# Patient Record
Sex: MALE | Race: WHITE | NOT HISPANIC OR LATINO | Employment: OTHER | URBAN - NONMETROPOLITAN AREA
[De-identification: names, ages, dates, MRNs, and addresses within clinical notes are randomized per-mention and may not be internally consistent; named-entity substitution may affect disease eponyms.]

---

## 2020-08-12 ENCOUNTER — OFFICE VISIT CONVERTED (OUTPATIENT)
Dept: FAMILY MEDICINE CLINIC | Age: 65
End: 2020-08-12
Attending: FAMILY MEDICINE

## 2020-08-13 ENCOUNTER — HOSPITAL ENCOUNTER (OUTPATIENT)
Dept: OTHER | Facility: HOSPITAL | Age: 65
Discharge: HOME | End: 2020-08-13
Attending: FAMILY MEDICINE

## 2020-08-13 LAB
ALBUMIN SERPL-MCNC: 4.4 G/DL (ref 3.5–5)
ALBUMIN/GLOB SERPL: 1.3 {RATIO} (ref 1.4–2.6)
ALP SERPL-CCNC: 84 U/L (ref 56–155)
ALT SERPL-CCNC: 28 U/L (ref 10–40)
ANION GAP SERPL CALC-SCNC: 19 MMOL/L (ref 8–19)
AST SERPL-CCNC: 22 U/L (ref 15–50)
BASOPHILS # BLD MANUAL: 0.03 10*3/UL (ref 0–0.2)
BASOPHILS NFR BLD MANUAL: 0.4 % (ref 0–3)
BILIRUB SERPL-MCNC: 0.61 MG/DL (ref 0.2–1.3)
BUN SERPL-MCNC: 17 MG/DL (ref 5–25)
BUN/CREAT SERPL: 13 {RATIO} (ref 6–20)
CALCIUM SERPL-MCNC: 10 MG/DL (ref 8.7–10.4)
CHLORIDE SERPL-SCNC: 99 MMOL/L (ref 99–111)
CHOLEST SERPL-MCNC: 224 MG/DL (ref 107–200)
CHOLEST/HDLC SERPL: 5 {RATIO} (ref 3–6)
CONV CO2: 26 MMOL/L (ref 22–32)
CONV CREATININE URINE, RANDOM: 158.3 MG/DL (ref 10–300)
CONV MICROALBUM.,U,RANDOM: 17.1 MG/L (ref 0–20)
CONV TOTAL PROTEIN: 7.7 G/DL (ref 6.3–8.2)
CREAT UR-MCNC: 1.33 MG/DL (ref 0.7–1.2)
DEPRECATED RDW RBC AUTO: 40.7 FL
EOSINOPHIL # BLD MANUAL: 0.18 10*3/UL (ref 0–0.7)
EOSINOPHIL NFR BLD MANUAL: 2.3 % (ref 0–7)
ERYTHROCYTE [DISTWIDTH] IN BLOOD BY AUTOMATED COUNT: 12.1 % (ref 11.5–14.5)
EST. AVERAGE GLUCOSE BLD GHB EST-MCNC: 143 MG/DL
GFR SERPLBLD BASED ON 1.73 SQ M-ARVRAT: 56 ML/MIN/{1.73_M2}
GLOBULIN UR ELPH-MCNC: 3.3 G/DL (ref 2–3.5)
GLUCOSE SERPL-MCNC: 151 MG/DL (ref 70–99)
GRANS (ABSOLUTE): 4.22 10*3/UL (ref 2–8)
GRANS: 54.1 % (ref 30–85)
HBA1C MFR BLD: 17.4 G/DL (ref 14–18)
HBA1C MFR BLD: 6.6 % (ref 3.5–5.7)
HCT VFR BLD AUTO: 51.2 % (ref 42–52)
HDLC SERPL-MCNC: 45 MG/DL (ref 40–60)
IMM GRANULOCYTES # BLD: 0.01 10*3/UL (ref 0–0.54)
IMM GRANULOCYTES NFR BLD: 0.1 % (ref 0–0.43)
LDLC SERPL CALC-MCNC: 137 MG/DL (ref 70–100)
LYMPHOCYTES # BLD MANUAL: 2.54 10*3/UL (ref 1–5)
LYMPHOCYTES NFR BLD MANUAL: 10.5 % (ref 3–10)
MCH RBC QN AUTO: 30.9 PG (ref 27–31)
MCHC RBC AUTO-ENTMCNC: 34 G/DL (ref 33–37)
MCV RBC AUTO: 90.8 FL (ref 80–96)
MICROALBUMIN/CREAT UR: 10.8 MG/G{CRE} (ref 0–25)
MONOCYTES # BLD AUTO: 0.82 10*3/UL (ref 0.2–1.2)
OSMOLALITY SERPL CALC.SUM OF ELEC: 292 MOSM/KG (ref 273–304)
PLATELET # BLD AUTO: 211 10*3/UL (ref 130–400)
PMV BLD AUTO: 10.8 FL (ref 7.4–10.4)
POTASSIUM SERPL-SCNC: 4.5 MMOL/L (ref 3.5–5.3)
RBC # BLD AUTO: 5.64 10*6/UL (ref 4.7–6.1)
SODIUM SERPL-SCNC: 139 MMOL/L (ref 135–147)
TRIGL SERPL-MCNC: 212 MG/DL (ref 40–150)
TSH SERPL-ACNC: 2.83 M[IU]/L (ref 0.27–4.2)
VARIANT LYMPHS NFR BLD MANUAL: 32.6 % (ref 20–45)
VLDLC SERPL-MCNC: 42 MG/DL (ref 5–37)
WBC # BLD AUTO: 7.8 10*3/UL (ref 4.8–10.8)

## 2020-09-24 ENCOUNTER — OFFICE VISIT CONVERTED (OUTPATIENT)
Dept: FAMILY MEDICINE CLINIC | Age: 65
End: 2020-09-24
Attending: FAMILY MEDICINE

## 2020-11-19 ENCOUNTER — HOSPITAL ENCOUNTER (OUTPATIENT)
Dept: OTHER | Facility: HOSPITAL | Age: 65
Discharge: HOME | End: 2020-11-19
Attending: FAMILY MEDICINE

## 2020-11-19 LAB
ALBUMIN SERPL-MCNC: 4.1 G/DL (ref 3.5–5)
ALBUMIN/GLOB SERPL: 1.3 {RATIO} (ref 1.4–2.6)
ALP SERPL-CCNC: 80 U/L (ref 56–155)
ALT SERPL-CCNC: 27 U/L (ref 10–40)
ANION GAP SERPL CALC-SCNC: 17 MMOL/L (ref 8–19)
AST SERPL-CCNC: 21 U/L (ref 15–50)
BASOPHILS # BLD MANUAL: 0.05 10*3/UL (ref 0–0.2)
BASOPHILS NFR BLD MANUAL: 0.8 % (ref 0–3)
BILIRUB SERPL-MCNC: 0.48 MG/DL (ref 0.2–1.3)
BUN SERPL-MCNC: 18 MG/DL (ref 5–25)
BUN/CREAT SERPL: 13 {RATIO} (ref 6–20)
CALCIUM SERPL-MCNC: 8.9 MG/DL (ref 8.7–10.4)
CHLORIDE SERPL-SCNC: 100 MMOL/L (ref 99–111)
CHOLEST SERPL-MCNC: 186 MG/DL (ref 107–200)
CHOLEST/HDLC SERPL: 4.3 {RATIO} (ref 3–6)
CONV CO2: 23 MMOL/L (ref 22–32)
CONV TOTAL PROTEIN: 7.2 G/DL (ref 6.3–8.2)
CREAT UR-MCNC: 1.37 MG/DL (ref 0.7–1.2)
DEPRECATED RDW RBC AUTO: 41 FL
EOSINOPHIL # BLD MANUAL: 0.1 10*3/UL (ref 0–0.7)
EOSINOPHIL NFR BLD MANUAL: 1.6 % (ref 0–7)
ERYTHROCYTE [DISTWIDTH] IN BLOOD BY AUTOMATED COUNT: 12.3 % (ref 11.5–14.5)
EST. AVERAGE GLUCOSE BLD GHB EST-MCNC: 174 MG/DL
GFR SERPLBLD BASED ON 1.73 SQ M-ARVRAT: 54 ML/MIN/{1.73_M2}
GLOBULIN UR ELPH-MCNC: 3.1 G/DL (ref 2–3.5)
GLUCOSE SERPL-MCNC: 150 MG/DL (ref 70–99)
GRANS (ABSOLUTE): 3.63 10*3/UL (ref 2–8)
GRANS: 58.2 % (ref 30–85)
HBA1C MFR BLD: 16.1 G/DL (ref 14–18)
HBA1C MFR BLD: 7.7 % (ref 3.5–5.7)
HCT VFR BLD AUTO: 47.4 % (ref 42–52)
HDLC SERPL-MCNC: 43 MG/DL (ref 40–60)
IMM GRANULOCYTES # BLD: 0 10*3/UL (ref 0–0.54)
IMM GRANULOCYTES NFR BLD: 0 % (ref 0–0.43)
LDLC SERPL CALC-MCNC: 114 MG/DL (ref 70–100)
LYMPHOCYTES # BLD MANUAL: 1.73 10*3/UL (ref 1–5)
LYMPHOCYTES NFR BLD MANUAL: 11.7 % (ref 3–10)
MCH RBC QN AUTO: 30.9 PG (ref 27–31)
MCHC RBC AUTO-ENTMCNC: 34 G/DL (ref 33–37)
MCV RBC AUTO: 91 FL (ref 80–96)
MONOCYTES # BLD AUTO: 0.73 10*3/UL (ref 0.2–1.2)
OSMOLALITY SERPL CALC.SUM OF ELEC: 287 MOSM/KG (ref 273–304)
PLATELET # BLD AUTO: 223 10*3/UL (ref 130–400)
PMV BLD AUTO: 11.2 FL (ref 7.4–10.4)
POTASSIUM SERPL-SCNC: 4 MMOL/L (ref 3.5–5.3)
PSA SERPL-MCNC: 0.43 NG/ML (ref 0–4)
RBC # BLD AUTO: 5.21 10*6/UL (ref 4.7–6.1)
SODIUM SERPL-SCNC: 136 MMOL/L (ref 135–147)
TRIGL SERPL-MCNC: 147 MG/DL (ref 40–150)
TSH SERPL-ACNC: 3.17 M[IU]/L (ref 0.27–4.2)
VARIANT LYMPHS NFR BLD MANUAL: 27.7 % (ref 20–45)
VLDLC SERPL-MCNC: 29 MG/DL (ref 5–37)
WBC # BLD AUTO: 6.24 10*3/UL (ref 4.8–10.8)

## 2020-12-08 ENCOUNTER — OFFICE VISIT CONVERTED (OUTPATIENT)
Dept: FAMILY MEDICINE CLINIC | Age: 65
End: 2020-12-08
Attending: FAMILY MEDICINE

## 2021-04-06 ENCOUNTER — HOSPITAL ENCOUNTER (OUTPATIENT)
Dept: OTHER | Facility: HOSPITAL | Age: 66
Discharge: HOME | End: 2021-04-06
Attending: FAMILY MEDICINE

## 2021-04-06 LAB
ALBUMIN SERPL-MCNC: 3.8 G/DL (ref 3.5–5)
ALBUMIN/GLOB SERPL: 1.1 {RATIO} (ref 1.4–2.6)
ALP SERPL-CCNC: 85 U/L (ref 56–155)
ALT SERPL-CCNC: 33 U/L (ref 10–40)
ANION GAP SERPL CALC-SCNC: 17 MMOL/L (ref 8–19)
AST SERPL-CCNC: 25 U/L (ref 15–50)
BILIRUB SERPL-MCNC: 0.64 MG/DL (ref 0.2–1.3)
BUN SERPL-MCNC: 17 MG/DL (ref 5–25)
BUN/CREAT SERPL: 12 {RATIO} (ref 6–20)
CALCIUM SERPL-MCNC: 9.4 MG/DL (ref 8.7–10.4)
CHLORIDE SERPL-SCNC: 99 MMOL/L (ref 99–111)
CHOLEST SERPL-MCNC: 192 MG/DL (ref 107–200)
CHOLEST/HDLC SERPL: 4.5 {RATIO} (ref 3–6)
CONV CO2: 25 MMOL/L (ref 22–32)
CONV TOTAL PROTEIN: 7.3 G/DL (ref 6.3–8.2)
CREAT UR-MCNC: 1.37 MG/DL (ref 0.7–1.2)
EST. AVERAGE GLUCOSE BLD GHB EST-MCNC: 169 MG/DL
GFR SERPLBLD BASED ON 1.73 SQ M-ARVRAT: 54 ML/MIN/{1.73_M2}
GLOBULIN UR ELPH-MCNC: 3.5 G/DL (ref 2–3.5)
GLUCOSE SERPL-MCNC: 168 MG/DL (ref 70–99)
HBA1C MFR BLD: 7.5 % (ref 3.5–5.7)
HDLC SERPL-MCNC: 43 MG/DL (ref 40–60)
LDLC SERPL CALC-MCNC: 103 MG/DL (ref 70–100)
OSMOLALITY SERPL CALC.SUM OF ELEC: 287 MOSM/KG (ref 273–304)
POTASSIUM SERPL-SCNC: 4.5 MMOL/L (ref 3.5–5.3)
SODIUM SERPL-SCNC: 136 MMOL/L (ref 135–147)
TRIGL SERPL-MCNC: 231 MG/DL (ref 40–150)
VLDLC SERPL-MCNC: 46 MG/DL (ref 5–37)

## 2021-04-16 ENCOUNTER — OFFICE VISIT CONVERTED (OUTPATIENT)
Dept: FAMILY MEDICINE CLINIC | Age: 66
End: 2021-04-16
Attending: FAMILY MEDICINE

## 2021-05-18 NOTE — PROGRESS NOTES
Kang MantillaChencho  1955     Office/Outpatient Visit    Visit Date: Fri, Apr 16, 2021 09:04 am    Provider: Maverick Robins MD (Assistant: Quiana Wilson MA)    Location: [facility]        Electronically signed by Maverick Robins MD on  04/17/2021 09:48:57 AM                             Subjective:        CC: diabetes, blood pressure    HPI:           Patient to be evaluated for type 2 diabetes mellitus with hyperglycemia.  Current meds include an oral hypoglycemic ( Amaryl and Glucophage XR ).  He reports home blood glucose readings have been a bit high, with average fasting readings in the 150-180 mg/dL range. He checks his glucose 1 to 2 times daily.  Most recent lab results include Hemoglobin A1c:  7.5 (%) (04/06/2021), LDL:  103 (mg/dL) (04/06/2021).            Concerning essential (primary) hypertension, his current cardiac medication regimen includes a beta-blocker ( Toprol-XL ).  Review of his blood pressure log reveals systolics in the 130s and diastolics in the 80s.  He is tolerating the medication well without side effects.  Compliance with treatment has been good; he takes his medication as directed and follows up as directed.      ROS:     CONSTITUTIONAL:  Negative for chills and fever.      CARDIOVASCULAR:  Negative for chest pain and palpitations.      RESPIRATORY:  Negative for recent cough and dyspnea.      GASTROINTESTINAL:  Negative for abdominal pain, nausea and vomiting.      INTEGUMENTARY:  Negative for atypical mole(s) and rash.          Past Medical History / Family History / Social History:         Last Reviewed on 12/08/2020 10:40 AM by Maverick Robins    Past Medical History:             PAST MEDICAL HISTORY         Positive for    Type 2 Diabetes and    Hypertension;         ADVANCE DIRECTIVE Living will - His wife, Mary Ogden, would make decisions for him if needed.          PREVENTIVE HEALTH MAINTENANCE             COLORECTAL CANCER SCREENING: Up to  date (colonoscopy q10y; sigmoidoscopy q5y; Cologuard q3y) was last done 2018; colonoscopy with the following abnormalities noted-- tubular adenoma; The next colonoscopy is due           Surgical History:         Joint Replacement: right knee; 2015; Johan Pillai;         Family History:     Father: Healthy;   at age 72; Cause of death was chain saw accident     Mother: Coronary Artery Disease; Myocardial Infarction         Social History:     Occupation: Retired (Prior occupation: [employer] and )     Marital Status:      Children: None     Hobbies/Recreation: he enjoys gardening/[employer];     Exercise: Primary form of exercise is walking.          Tobacco/Alcohol/Supplements:     Last Reviewed on 2020 10:40 AM by Maverick Robins    Tobacco: He has never smoked.          Alcohol: Frequency: Just on weekends When he drinks, the average quantity of alcohol is 4 drinks.   He typically consumes beer.      Caffeine:  He admits to consuming caffeine via coffee ( 2 servings per day ).          Substance Abuse History:     Last Reviewed on 2020 10:40 AM by Maverick Robins    None         Mental Health History:     Last Reviewed on 2020 10:40 AM by Maverick Robins    NEGATIVE         Communicable Diseases (eg STDs):     Last Reviewed on 2020 10:40 AM by Maverick Robins        Current Problems:     Last Reviewed on 2020 10:40 AM by Maverick Robins    Type 2 diabetes mellitus with hyperglycemia    Essential (primary) hypertension    Encounter for screening for depression    Hyperlipidemia, unspecified    Encounter for general adult medical examination without abnormal findings    Encounter for screening for malignant neoplasm of prostate    Polyp of colon        Immunizations:     COVID-19, mRNA, LNP-S, PF, 100 mcg/0.5 mL dose 3/4/2021    COVID-19, mRNA, LNP-S, PF, 100 mcg/0.5 mL dose 2021    influenza, high-dose,  quadrivalent 9/8/2020    influenza, injectable, quadrivalent, preservative free 8/12/2020    Pneumococcal conjugate PCV 13 6/2/2017    pneumococcal polysaccharide PPV23 6/1/2018    zoster recombinant 12/3/2016    zoster recombinant 6/2/2017        Allergies:     Last Reviewed on 12/08/2020 10:40 AM by Maverick Robins    atorvastatin: Myalgia  (Adverse Reaction)        Current Medications:     Last Reviewed on 12/08/2020 10:40 AM by Maverick Robins    glimepiride 2 mg oral tablet [take 1 tablet (2 mg) by oral route once daily]    metoprolol succinate 25 mg oral Tablet, Extended Release 24 hr [take 1 tablet (25 mg) by oral route once daily]    metFORMIN 500 mg oral Tablet, Extended Release Gastric Retention 24 hr [take 2 tablets (1,000 mg) by oral route once daily with the evening meal]        Objective:        Vitals:         Current: 4/16/2021 9:07:14 AM    Ht:  6 ft, 1 in;  Wt: 237.6 lbs;  BMI: 31.3T: 96.3 F (temporal);  BP: 150/83 mm Hg (right arm, sitting);  P: 91 bpm (right arm (BP Cuff), sitting);  sCr: 1.37 mg/dL;  GFR: 60.57        Repeat:     9:27:20 AM  BP:   131/84mm Hg (right arm, sitting, p88)     Exams:     PHYSICAL EXAM:     GENERAL: Vitals recorded well developed, well nourished;     NECK: range of motion is normal; thyroid is non-palpable;     RESPIRATORY: normal respiratory rate and pattern with no distress; normal breath sounds with no rales, rhonchi, wheezes or rubs;     CARDIOVASCULAR: normal rate; rhythm is regular;  no systolic murmur;     GASTROINTESTINAL: nontender; normal bowel sounds; no masses;     LYMPHATIC: no enlargement of cervical or facial nodes; no supraclavicular nodes;     SKIN:  no significant rashes or lesions; no suspicious moles;     NEUROLOGIC: mental status: alert and oriented x 3; cranial nerves II-XII grossly intact;     PSYCHIATRIC: appropriate affect and demeanor; normal psychomotor function;         Assessment:         E11.65   Type 2 diabetes mellitus  with hyperglycemia       I10   Essential (primary) hypertension       N18.31   Chronic kidney disease, stage 3a           ORDERS:         Meds Prescribed:       [Refilled] glimepiride 2 mg oral tablet [take 1 tablet (2 mg) by oral route once daily], #90 (ninety) tablets, Refills: 1 (one)       [Refilled] metFORMIN 500 mg oral Tablet, Extended Release Gastric Retention 24 hr [take 2 tablets (1,000 mg) by oral route 2 times per day with the evening meal], #360 (three hundred and sixty) tablets, Refills: 1 (one)       [Refilled] metoprolol succinate 25 mg oral Tablet, Extended Release 24 hr [take 1 tablet (25 mg) by oral route once daily], #90 (ninety) tablets, Refills: 1 (one)                 Plan:         Type 2 diabetes mellitus with hyperglycemia         RECOMMENDATIONS given include: We have reviewed Kang's care.  His A1C was actually a little better on this check.  We will ask him to push up to 4 tabs daily of metformin and continue glimepiride.  He is going to try to be more active in the next few months.  We will contact him in 90 days for repeat labs..            Prescriptions:       [Refilled] glimepiride 2 mg oral tablet [take 1 tablet (2 mg) by oral route once daily], #90 (ninety) tablets, Refills: 1 (one)       [Refilled] metFORMIN 500 mg oral Tablet, Extended Release Gastric Retention 24 hr [take 2 tablets (1,000 mg) by oral route 2 times per day with the evening meal], #360 (three hundred and sixty) tablets, Refills: 1 (one)       [Refilled] metoprolol succinate 25 mg oral Tablet, Extended Release 24 hr [take 1 tablet (25 mg) by oral route once daily], #90 (ninety) tablets, Refills: 1 (one)         Essential (primary) hypertensionAs above.        Chronic kidney disease, stage 3aAs above.            Charge Capture:         Primary Diagnosis:     E11.65  Type 2 diabetes mellitus with hyperglycemia           Orders:      61580  Office/outpatient visit; established patient, level 4  (In-House)               I10  Essential (primary) hypertension     N18.31  Chronic kidney disease, stage 3a

## 2021-05-18 NOTE — PROGRESS NOTES
Kang Mantilla  1955     Office/Outpatient Visit    Visit Date: Thu, Sep 24, 2020 09:01 am    Provider: Zachary Garcia MD (Assistant: Abigail Riddle MA)    Location: [facility]        Electronically signed by Zachary Garcia MD on  09/24/2020 01:46:55 PM                             Subjective:        CC: taking metformin once daily Mr. Mantilla is a 65 year old male.  This is a follow-up visit.  [facility]         HPI:           Mr. Mantilla is here for a Medicare wellness visit.  The required HRA questions are integrated within this visit note. Family medical history and individual medical/surgical history were reviewed and updated.  A current height, weight, BMI, blood pressure, and pulse were recorded in the vitals section of the note and have been reviewed. Patient's medications, including supplements, were recorded in the chart and reviewed.  Current providers and suppliers were reviewed and updated.          Self-Assessment of Health: He rates his health as good. He rates his confidence of being able to control/manage most of his health problems as very confident. His physical/emotional health has limited his social activites not at all.  A review of cognitive impairment was performed, including ability to drive a car, manage finances, and any memory changes, and was found to be negative.  A review of functional ability, including bathing, dressing, walking, and urine/bowel continence as well as level of safety was performed and was found to be negative.  Falls Risk: Has not had any falls or only one fall without injury in the past year.  He denies having trouble hearing the TV/radio when others do not, having to strain to hear or understand conversations and wearing hearing aid(s).  Concerning home safety, he reports that at home he DOES have adequate lighting, a skid resistant shower/tub, handrails on stairs and functioning smoke alarms, but not grab bars in the bath or absence of throw  christina.          Immunization Status: ** Has not received pneumococcal vaccination; Physical Activity: He exercises but less than 20 minutes 3 days per week; Type of diet patient normally eats is described as well-balanced with fruits and vegetables Preventative Health updated today.            PHQ-9 Depression Screening: Completed form scanned and in chart; Total Score 1       BP today is 148/84 with a HR of 102. BP at initial visit on 8/12/20 was 138/84 with a HR of 99. He reports it was 138/75 with a HR of 96 this morning at home.      A1c was 6.6 on 8/13/20. He is on metformin 1,000 mg qd and glimepiride 2 mg qd. He checks glucose about once a day and its often 120-160. His diet is about the same, does pretty well on diet but does eat ice cream some times. He drinks water only. For breakfast he eats eggs and rojo. He thinks he gets tired with glimepiride.      Initial labs on 8/13 showed a total cholesterol fo 224 and LDL of 137. I called him in atorvastatin 40 mg qd. At night his hips hurt since starting the medicine.    ROS:     CONSTITUTIONAL:  Negative for fatigue and fever.      EYES:  Negative for blurred vision.      E/N/T:  Negative for diminished hearing and nasal congestion.      CARDIOVASCULAR:  Negative for chest pain and palpitations.      RESPIRATORY:  Negative for recent cough and dyspnea.      GASTROINTESTINAL:  Negative for abdominal pain, constipation, diarrhea, nausea and vomiting.      GENITOURINARY:  Negative for dysuria.      MUSCULOSKELETAL:  Positive for myalgias (hips).   Negative for arthralgias.      INTEGUMENTARY:  Negative for atypical mole(s) and rash.      NEUROLOGICAL:  Negative for paresthesias and weakness.      PSYCHIATRIC:  Negative for anxiety, depression and sleep disturbance.          Past Medical History / Family History / Social History:         Last Reviewed on 9/24/2020 01:46 PM by Zachary Garcia    Past Medical History:             PAST MEDICAL HISTORY          Positive for    Type 2 Diabetes and    Hypertension;         PREVENTIVE HEALTH MAINTENANCE             COLORECTAL CANCER SCREENING: Up to date (colonoscopy q10y; sigmoidoscopy q5y; Cologuard q3y) was last done 2018; colonoscopy with the following abnormalities noted-- Polyp(s); The next colonoscopy is due           Surgical History:         [place];         Family History:     Father: Healthy;   at age 72; Cause of death was chain saw accident     Mother: Coronary Artery Disease; Myocardial Infarction         Social History:     Occupation: Retired (Prior occupation: [employer] )     Marital Status:      Children: None     Hobbies/Recreation: he enjoys gardening/[employer];     Exercise: Primary form of exercise is walking.          Tobacco/Alcohol/Supplements:     Last Reviewed on 2020 01:46 PM by Zachary Garcia    Tobacco: He has never smoked.          Alcohol: Frequency: Just on weekends When he drinks, the average quantity of alcohol is 4 drinks.   He typically consumes beer.      Caffeine:  He admits to consuming caffeine via coffee ( 2 servings per day ).          Substance Abuse History:     Last Reviewed on 2020 01:46 PM by Zachary Garcia    None         Mental Health History:     Last Reviewed on 2020 01:46 PM by Zachary Garcia    NEGATIVE         Communicable Diseases (eg STDs):     Last Reviewed on 2020 01:46 PM by Zachary Garcia        Current Problems:     Last Reviewed on 2020 01:46 PM by Zachary Garcia    Type 2 diabetes mellitus with hyperglycemia    Essential (primary) hypertension    Encounter for screening for depression    Hyperlipidemia, unspecified    Encounter for general adult medical examination without abnormal findings    Encounter for screening for malignant neoplasm of prostate        Immunizations:     influenza, injectable, quadrivalent, preservative free  8/12/2020    influenza, high-dose, quadrivalent 9/8/2020    Pneumococcal conjugate PCV 13 6/2/2017    zoster recombinant 12/3/2016    zoster recombinant 6/2/2017        Allergies:     Last Reviewed on 9/24/2020 01:46 PM by Zachary Garcia    No Known Allergies.        Current Medications:     Last Reviewed on 9/24/2020 01:46 PM by Zachary Garcia    metFORMIN 1,000 mg oral Tablet, Extended Release Gastric Retention 24 hr [take 1 tablet (1,000 mg) by oral route 2 times per day with meals]    Glimepiride 2mg  [TAKE ONE TABLET BY MOUTH EVERY DAY WITH BREAKFAST]    atorvastatin 40 mg oral tablet [take 1 tablet (40 mg) by oral route once daily]        Objective:        Vitals:         Current: 9/24/2020 9:16:27 AM    Ht:  6 ft, 1 in;  Wt: 233.2 lbs;  BMI: 30.8T: 96.6 F (temporal);  BP: 148/84 mm Hg (left arm, sitting);  P: 102 bpm (left arm (BP Cuff), sitting);  sCr: 1.33 mg/dL;  GFR: 61.89VA: 20/70 OD, 20/70 OS (without correction)        Exams:     PHYSICAL EXAM:     GENERAL: Vitals recorded well developed, well nourished;  well groomed;  no apparent distress;     EYES: extraocular movements intact; conjunctiva and cornea are normal; PERRLA;     E/N/T: OROPHARYNX:  normal mucosa, dentition, gingiva, and posterior pharynx;     NECK: range of motion is normal; trachea is midline;     RESPIRATORY: normal respiratory rate and pattern with no distress; normal breath sounds with no rales, rhonchi, wheezes or rubs;     CARDIOVASCULAR: mildly tachycardic;  rhythm is regular;  no systolic murmur; no edema;     GASTROINTESTINAL: nontender; normal bowel sounds;     LYMPHATIC: no enlargement of cervical or facial nodes;     MUSCULOSKELETAL: normal gait; normal overall tone spine: no scoliosis or other abnormal spinal curvatures;     NEUROLOGIC: mental status: alert and oriented x 3; GROSSLY INTACT     PSYCHIATRIC:  appropriate affect and demeanor; normal speech pattern; grossly normal memory;         Assessment:         Z00.00    Encounter for general adult medical examination without abnormal findings       Z13.31   Encounter for screening for depression       I10   Essential (primary) hypertension       E11.65   Type 2 diabetes mellitus with hyperglycemia       E78.5   Hyperlipidemia, unspecified       Z12.5   Encounter for screening for malignant neoplasm of prostate           ORDERS:         Meds Prescribed:       [New Rx] metoprolol succinate 25 mg oral Tablet, Extended Release 24 hr [take 1 tablet (25 mg) by oral route once daily], #90 (ninety) tablets, Refills: 1 (one)       [New Rx] Januvia 25 mg oral tablet [take 1 tablet (25 mg) by oral route once daily], #90 (ninety) tablets, Refills: 0 (zero)         Lab Orders:       APPTO  Appointment need  (In-House)            FUTURE  Future order to be done at patients convenience  (Send-Out)            [facility]  ([facility])            35962  DIAB[address] [place] [address] LIPID,CMP, A1C: [id number], 35057, 09938  (Send-Out)            30986  TSH - [facility] TSH  (Send-Out)            FUTURE  Future order to be done at patients convenience  (Send-Out)            *  [facility] Medicare screening PSA  (Send-Out)              Other Orders:       1101F  Pt screen for fall risk; document no falls in past year or only 1 fall w/o injury in past year (TREVON)  (In-House)              Depression screen negative  (In-House)                      Plan:         Encounter for general adult medical examination without abnormal findingsWe discussed nutrition and pt advised to eat a low sugar, non-processed diet. We discussed physical activity and healthy weight and pt is advised to exercise 150 minutes per week in addition to his activity at work. Pt does not smoke, does not use tobacco products, and does not use illicit drugs. Pt is advised to limit alcohol to 2 drinks per day at most. He is advised to stay up to date with dental health and immunizations. He is UTD on screenings.     MIPS Has had no  falls or only one fall without injury in the past year ADVANCE DIRECTIVES (Please update in PMH): Living will signed request copy           Orders:       1101F  Pt screen for fall risk; document no falls in past year or only 1 fall w/o injury in past year (TREVON)  (In-House)              Encounter for screening for depression    MIPS PHQ-9 Depression Screening: Completed form scanned and in chart; Total Score 1; Negative Depression Screen           Orders:         Depression screen negative  (In-House)              Essential (primary) hypertensionPt is reluctantly agreeable to start metoprolol 25 mg qd.           Prescriptions:       [New Rx] metoprolol succinate 25 mg oral Tablet, Extended Release 24 hr [take 1 tablet (25 mg) by oral route once daily], #90 (ninety) tablets, Refills: 1 (one)         Type 2 diabetes mellitus with hyperglycemiaDM 2 is well controlled, will start januvia 25 mg qd to replace glimepiride 2 mg qd if januvia is not expensive. He is OK with just continuing glimepiride if januvia is expensive. Consider trulicity as well.         FOLLOW-UP: Schedule a follow-up visit in 2 months.:.      FOLLOW-UP TESTING #1: FOLLOW-UP LABORATORY:  Labs to be scheduled in the future include CBC, Diabetes Panel 1; CMP, Lipid, A1C, and TSH.   Patient to schedule in 2 months.            Prescriptions:       [New Rx] Januvia 25 mg oral tablet [take 1 tablet (25 mg) by oral route once daily], #90 (ninety) tablets, Refills: 0 (zero)           Orders:       APPTO  Appointment need  (In-House)            FUTURE  Future order to be done at patients convenience  (Send-Out)            30713  BDCBC - [facility] CBC with 3 part diff  (Send-Out)            65238  DIAB1 - [facility] LIPID,CMP, A1C: 74911, 30164, 26724  (Send-Out)            60083  TSH - [facility] TSH  (Send-Out)              Hyperlipidemia, unspecifiedHip pain is gradually improving but consider reducing to 20 mg qd at next visit.         Encounter for screening for  malignant neoplasm of prostate        FOLLOW-UP TESTING #1: FOLLOW-UP LABORATORY:  Labs to be scheduled in the future include PSA Screening Medicare patients.            Orders:       FUTURE  Future order to be done at patients convenience  (Send-Out)            *  PRSAS Medicare screening PSA  (Send-Out)                  Patient Recommendations:        For  Encounter for general adult medical examination without abnormal findings:    I also recommend request copy.          For  Type 2 diabetes mellitus with hyperglycemia:    Schedule a follow-up visit in 2 months.                APPOINTMENT INFORMATION:        Monday Tuesday Wednesday Thursday Friday Saturday Sunday            Time:___________________AM  PM   Date:_____________________         The following laboratory testing has been ordered: CBC TSH Schedule the above testing in 2 months.          For  Encounter for screening for malignant neoplasm of prostate:            The following laboratory testing has been ordered:             Charge Capture:         Primary Diagnosis:     Z00.00  Encounter for general adult medical examination without abnormal findings           Orders:      42284  Preventive medicine, established patient, age 65+ years  (In-House)            1101F  Pt screen for fall risk; document no falls in past year or only 1 fall w/o injury in past year (TREVON)  (In-House)              Z13.31  Encounter for screening for depression           Orders:        Depression screen negative  (In-House)              I10  Essential (primary) hypertension     E11.65  Type 2 diabetes mellitus with hyperglycemia           Orders:      APPTO  Appointment need  (In-House)              E78.5  Hyperlipidemia, unspecified     Z12.5  Encounter for screening for malignant neoplasm of prostate

## 2021-05-18 NOTE — PROGRESS NOTES
Kang Mantilla  1955     Office/Outpatient Visit    Visit Date: Tue, Dec 8, 2020 09:52 am    Provider: Maverick Robins MD (Assistant: Mary Torres,  )    Location: [facility]        Electronically signed by Maverick Robins MD on  12/09/2020 09:06:10 AM                             Subjective:        CC: diabetes, blood pressure, cholesterol, colon polyps    HPI:           Patient to be evaluated for type 2 diabetes mellitus with hyperglycemia.  Current meds include an oral hypoglycemic ( Amaryl and Glucophage XR ).  He reports home blood glucose readings have been fairly good, with average fasting glucoses running in the 120-150 mg/dL range. He checks his glucose 1 to 2 times daily.  Most recent lab results include Hemoglobin A1c:  7.7 (%) (11/19/2020), LDL:  114 (mg/dL) (11/19/2020).            Essential (primary) hypertension details; his current cardiac medication regimen includes a beta-blocker ( Toprol-XL ).  He is tolerating the medication well without side effects.  Compliance with treatment has been good; he takes his medication as directed and follows up as directed.            Hyperlipidemia, unspecified details; current treatment includes diet.  Compliance with treatment has been good; he follows up as directed.  Kang was on atorvastatin, but he stopped taking that due to some intolerance and pain in the legs.          Kang also has history of colon polyps - tubular adenoma - for which he will be due for colonoscopy in the next few months.    ROS:     CONSTITUTIONAL:  Negative for chills and fever.      CARDIOVASCULAR:  Negative for chest pain and palpitations.      RESPIRATORY:  Negative for recent cough and dyspnea.      GASTROINTESTINAL:  Negative for abdominal pain, nausea and vomiting.      INTEGUMENTARY:  Negative for atypical mole(s) and rash.          Past Medical History / Family History / Social History:         Last Reviewed on 12/08/2020 10:40 AM by  Maverick Robins    Past Medical History:             PAST MEDICAL HISTORY         Positive for    Type 2 Diabetes and    Hypertension;         ADVANCE DIRECTIVE Living will - His wife, Mary Ogden, would make decisions for him if needed.          PREVENTIVE HEALTH MAINTENANCE             COLORECTAL CANCER SCREENING: Up to date (colonoscopy q10y; sigmoidoscopy q5y; Cologuard q3y) was last done 2018; colonoscopy with the following abnormalities noted-- tubular adenoma; The next colonoscopy is due           Surgical History:         Joint Replacement: right knee; 2015; [place] - Dr. Pillai;         Family History:     Father: Healthy;   at age 72; Cause of death was chain saw accident     Mother: Coronary Artery Disease; Myocardial Infarction         Social History:     Occupation: Retired (Prior occupation: [employer] )     Marital Status:      Children: None     Hobbies/Recreation: he enjoys gardening/Calcivising;     Exercise: Primary form of exercise is walking.          Tobacco/Alcohol/Supplements:     Last Reviewed on 2020 10:40 AM by Maverick Robins    Tobacco: He has never smoked.          Alcohol: Frequency: Just on weekends When he drinks, the average quantity of alcohol is 4 drinks.   He typically consumes beer.      Caffeine:  He admits to consuming caffeine via coffee ( 2 servings per day ).          Substance Abuse History:     Last Reviewed on 2020 10:40 AM by Maverick Robins    None         Mental Health History:     Last Reviewed on 2020 10:40 AM by Maverick Robins    NEGATIVE         Communicable Diseases (eg STDs):     Last Reviewed on 2020 10:40 AM by Maverick Robins        Current Problems:     Last Reviewed on 2020 10:40 AM by Maverick Robins    Type 2 diabetes mellitus with hyperglycemia    Essential (primary) hypertension    Encounter for screening for depression    Hyperlipidemia,  unspecified    Encounter for general adult medical examination without abnormal findings    Encounter for screening for malignant neoplasm of prostate    Polyp of colon        Immunizations:     influenza, high-dose, quadrivalent 9/8/2020    influenza, injectable, quadrivalent, preservative free 8/12/2020    Pneumococcal conjugate PCV 13 6/2/2017    zoster recombinant 12/3/2016    zoster recombinant 6/2/2017        Allergies:     Last Reviewed on 12/08/2020 10:40 AM by Maverick Robins    atorvastatin: Myalgia  (Adverse Reaction)        Current Medications:     Last Reviewed on 12/08/2020 10:40 AM by Maverick Robins    metFORMIN 1,000 mg oral Tablet, Extended Release Gastric Retention 24 hr [take 1 tablet (1,000 mg) by oral route 2 times per day with meals]    Glimepiride 2mg  [TAKE ONE TABLET BY MOUTH EVERY DAY WITH BREAKFAST]    metoprolol succinate 25 mg oral Tablet, Extended Release 24 hr [take 1 tablet (25 mg) by oral route once daily]        Objective:        Vitals:         Current: 12/8/2020 10:00:12 AM    Ht:  6 ft, 1 in;  Wt: 235.6 lbs;  BMI: 31.1T: 96.5 F (temporal);  BP: 129/77 mm Hg (left arm, sitting);  P: 90 bpm (left arm (BP Cuff), sitting);  sCr: 1.37 mg/dL;  GFR: 60.35        Exams:     PHYSICAL EXAM:     GENERAL: Vitals recorded well developed, obese;     EYES: extraocular movements intact; conjunctiva and cornea are normal; PERRL;     E/N/T: EARS:  normal external auditory canals and tympanic membranes;  grossly normal hearing;     NECK: range of motion is normal; thyroid is non-palpable;     RESPIRATORY: normal respiratory rate and pattern with no distress; normal breath sounds with no rales, rhonchi, wheezes or rubs;     CARDIOVASCULAR: normal rate; rhythm is regular;  no systolic murmur;     GASTROINTESTINAL: nontender; normal bowel sounds; no masses;     LYMPHATIC: no enlargement of cervical or facial nodes; no supraclavicular nodes;     SKIN:  no significant rashes or lesions;  no suspicious moles;     NEUROLOGIC: mental status: alert and oriented x 3; cranial nerves II-XII grossly intact;     PSYCHIATRIC: appropriate affect and demeanor; normal psychomotor function;     Foot exam performed.      Left foot exam    Protective sensation using Monofilament test: NORMAL sensation. Patient detects .07 grams of force which is considered normal.    Vascular status: normal peripheral vascular exam with palpable dorsal pedal and posterior tibal pulses and brisk digital capillary refill    Skin is intact without sores or ulcers    Right foot exam    Protective sensation using Monofilament test: NORMAL sensation. Patient detects .07 grams of force which is considered normal.    Vascular status: normal peripheral vascular exam with palpable dorsal pedal and posterior tibal pulses and brisk digital capillary refill    Skin is intact without sores or ulcers         Assessment:         E11.65   Type 2 diabetes mellitus with hyperglycemia       I10   Essential (primary) hypertension       E78.5   Hyperlipidemia, unspecified       K63.5   Polyp of colon           ORDERS:         Meds Prescribed:       [Refilled] glimepiride 2 mg oral tablet [take 1 tablet (2 mg) by oral route once daily], #90 (ninety) tablets, Refills: 1 (one)       [Refilled] metFORMIN 500 mg oral Tablet, Extended Release Gastric Retention 24 hr [take 2 tablets (1,000 mg) by oral route once daily with the evening meal], #180 (one hundred and eighty) tablets, Refills: 1 (one)       [Refilled] metoprolol succinate 25 mg oral Tablet, Extended Release 24 hr [take 1 tablet (25 mg) by oral route once daily], #90 (ninety) tablets, Refills: 1 (one)         Radiology/Test Orders:       3017F  Colorectal CA screen results documented and reviewed (PV)  (In-House)              Other Orders:       2028F  Foot examination performed (includes examination through visual inspection, sensory exam with monofilament, and pulse exam - report when any of the  three components are completed) (DM)4  (In-House)            1101F  Pt screen for fall risk; document no falls in past year or only 1 fall w/o injury in past year (TREVON)  (In-House)                      Plan:         Type 2 diabetes mellitus with hyperglycemia        RECOMMENDATIONS given include: Today, we have reviewed Kang's care.  I'm going to advise no specific change today.  We will continue his current medications.  We also reviewed his cholesterol.  He did not do well with atorvastatin.  For now, we will not give additional treatment for this.  His pressure is well controlled.  Consider ACE inhibitor at some point.  He will be due for colonoscopy in February.  We will reach out to him then to see if he wants to schedule..  RAMIRO Has had no falls or only one fall without injury in the past year Vaccines Flu and Pneumonia updated in Shot record Colorectal Cancer Screening is up to date and the results are in the chart           Prescriptions:       [Refilled] glimepiride 2 mg oral tablet [take 1 tablet (2 mg) by oral route once daily], #90 (ninety) tablets, Refills: 1 (one)       [Refilled] metFORMIN 500 mg oral Tablet, Extended Release Gastric Retention 24 hr [take 2 tablets (1,000 mg) by oral route once daily with the evening meal], #180 (one hundred and eighty) tablets, Refills: 1 (one)       [Refilled] metoprolol succinate 25 mg oral Tablet, Extended Release 24 hr [take 1 tablet (25 mg) by oral route once daily], #90 (ninety) tablets, Refills: 1 (one)           Orders:       2028F  Foot examination performed (includes examination through visual inspection, sensory exam with monofilament, and pulse exam - report when any of the three components are completed) (DM)4  (In-House)            1101F  Pt screen for fall risk; document no falls in past year or only 1 fall w/o injury in past year (TREVON)  (In-House)            3017F  Colorectal CA screen results documented and reviewed (PV)  (In-House)               Essential (primary) hypertensionAs above.        Hyperlipidemia, unspecifiedAs above.        Polyp of colonAs above.            Charge Capture:         Primary Diagnosis:     E11.65  Type 2 diabetes mellitus with hyperglycemia           Orders:      77967  Office/outpatient visit; established patient, level 4  (In-House)            2028F  Foot examination performed (includes examination through visual inspection, sensory exam with monofilament, and pulse exam - report when any of the three components are completed) (DM)4  (In-House)            1101F  Pt screen for fall risk; document no falls in past year or only 1 fall w/o injury in past year (TREVON)  (In-House)            3017F  Colorectal CA screen results documented and reviewed (PV)  (In-House)              I10  Essential (primary) hypertension     E78.5  Hyperlipidemia, unspecified     K63.5  Polyp of colon         ADDENDUMS:      ____________________________________    Addendum: 12/12/2020 07:47 JONE - Maverick Robins        Records from cardiology obtained and reviewed.

## 2021-05-18 NOTE — PROGRESS NOTES
Kang Mantilla  1955     Office/Outpatient Visit    Visit Date: Wed, Aug 12, 2020 09:36 am    Provider: Zachary Garcia MD (Assistant: Mary Gutiérrez MA)    Location: [facility]        Electronically signed by Zachary Garcia MD on  08/12/2020 07:08:22 PM                             Subjective:        CC: Mr. Mantilla is a 64 year old male.  This is his first visit to the clinic.  Establish care due to Dr. Quintana retiring.          HPI:           PHQ-9 Depression Screening: Completed form scanned and in chart; Total Score 0       BP today is 138/84 with a HR of 99. He checks BP occasionally and its typically normal.      Pt has DM 2. Last A1c was 7.6 around 7 months ago. He is on metformin 1,000 mg qd and glimepiride 2 mg qd. He checks glucose about once a day and its often 120-160. He does pretty well on diet but does eat ice cream some times. He drinks water only. For breakfast he eats eggs and rojo.    ROS:     CONSTITUTIONAL:  Negative for fatigue and fever.      EYES:  Negative for blurred vision.      E/N/T:  Negative for diminished hearing and nasal congestion.      CARDIOVASCULAR:  Negative for chest pain and palpitations.      RESPIRATORY:  Negative for recent cough and dyspnea.      GASTROINTESTINAL:  Negative for abdominal pain, constipation, diarrhea, nausea and vomiting.      GENITOURINARY:  Negative for dysuria.      MUSCULOSKELETAL:  Negative for arthralgias and myalgias.      INTEGUMENTARY:  Negative for atypical mole(s) and rash.      NEUROLOGICAL:  Negative for paresthesias and weakness.      PSYCHIATRIC:  Negative for anxiety, depression and sleep disturbance.          Past Medical History / Family History / Social History:         Last Reviewed on 8/12/2020 10:24 AM by Zachary Garcia    Past Medical History:             PAST MEDICAL HISTORY         Positive for    Type 2 Diabetes and    Hypertension;         PREVENTIVE HEALTH MAINTENANCE             COLORECTAL CANCER  SCREENING: Up to date (colonoscopy q10y; sigmoidoscopy q5y; Cologuard q3y) was last done 2018; colonoscopy with the following abnormalities noted-- Polyp(s); The next colonoscopy is due           Surgical History:         Joint Replacement: right knee; ; [place] [facility] - Dr. Pillai;         Family History:     Father: Healthy;   at age 72; Cause of death was chain saw accident     Mother: Coronary Artery Disease; Myocardial Infarction         Social History:     Occupation: Retired (Prior occupation: [employer] )     Marital Status:      Children: None     Hobbies/Recreation: he enjoys gardening/[employer]ing;     Exercise: Primary form of exercise is walking.          Tobacco/Alcohol/Supplements:     Last Reviewed on 2020 10:24 AM by Zachary Garcia    Tobacco: He has never smoked.          Alcohol: Frequency: Just on weekends When he drinks, the average quantity of alcohol is 4 drinks.   He typically consumes beer.      Caffeine:  He admits to consuming caffeine via coffee ( 2 servings per day ).          Substance Abuse History:     Last Reviewed on 2020 10:24 AM by Zachary Garcia    None         Mental Health History:     Last Reviewed on 2020 10:24 AM by Zachary Garcia    NEGATIVE         Communicable Diseases (eg STDs):     Last Reviewed on 2020 10:24 AM by Zachary Garcia        Current Problems:     Last Reviewed on 2020 10:24 AM by Zachary Garcia    Type 2 diabetes mellitus with hyperglycemia    Elevated blood-pressure reading, without diagnosis of hypertension    Encounter for screening for depression        Immunizations:     influenza, injectable, quadrivalent, preservative free 2020        Allergies:     Last Reviewed on 2020 10:24 AM by Zachary Garcia    No Known Allergies.        Current Medications:     Last Reviewed on 2020 10:24 AM by Zachary Garcia    metFORMIN 1,000 mg oral Tablet, Extended Release Gastric  Retention 24 hr [take 1 tablet (1,000 mg) by oral route 2 times per day with meals]    Glimepiride 2mg [TAKE ONE TABLET BY MOUTH EVERY DAY WITH BREAKFAST]        Objective:        Vitals:         Current: 8/12/2020 9:41:13 AM    Ht:  6 ft, 1 in;  Wt: 233.4 lbs;  BMI: 30.8T: 97.3 F (temporal);  BP: 138/84 mm Hg (right arm, sitting);  P: 99 bpm (right arm (BP Cuff), sitting)        Repeat:     10:36:3 AM  BP:   123/79mm Hg (Pulse 90)     Exams:     PHYSICAL EXAM:     GENERAL: Vitals recorded well developed, well nourished;  well groomed;  no apparent distress;     EYES: extraocular movements intact; conjunctiva and cornea are normal; PERRLA;     E/N/T: OROPHARYNX:  normal mucosa, dentition, gingiva, and posterior pharynx;     NECK: range of motion is normal; thyroid exam reveals not enlarged;     RESPIRATORY: normal respiratory rate and pattern with no distress; normal breath sounds with no rales, rhonchi, wheezes or rubs;     CARDIOVASCULAR: mildly tachycardic;  rhythm is regular;  no systolic murmur; no edema;     GASTROINTESTINAL: nontender; normal bowel sounds;     LYMPHATIC: no enlargement of cervical or facial nodes;     MUSCULOSKELETAL: normal gait; normal overall tone     NEUROLOGIC: mental status: alert and oriented x 3; reflexes: knee jerks: 2+;     PSYCHIATRIC:  appropriate affect and demeanor; normal speech pattern; grossly normal memory;         Assessment:         Z13.31   Encounter for screening for depression       R03.0   Elevated blood-pressure reading, without diagnosis of hypertension       E11.65   Type 2 diabetes mellitus with hyperglycemia           ORDERS:         Lab Orders:       FUTURE  Future order to be done at patients convenience  (Send-Out)            79550  BDCBC - [facility] CBC with 3 part diff  (Send-Out)            67359  DIAB2 - [facility] CMP A1C LIPID AND MICRO ALBUM CR RATIO: [id number]  (Send-Out)            79576  TSH - [facility] TSH  (Send-Out)            APPTO  Appointment  need  (In-House)              Other Orders:         Depression screen negative  (In-House)                      Plan:         Encounter for screening for depression    MIPS PHQ-9 Depression Screening: Completed form scanned and in chart; Total Score 0; Negative Depression Screen           Orders:         Depression screen negative  (In-House)              Elevated blood-pressure reading, without diagnosis of hypertensionPt advised BP and HR are a little elevated and he would benefit from metoprolol 25 mg qd but he defers. No shortness of breath with exertion and he is overall doing very well so this is reasonable.         Type 2 diabetes mellitus with hyperglycemiaDM 2 seems to be well controlled with A1c of 7.6 7 months ago. For now, cont metformin 1,000 mg qd and glimepiride 2 mg qd. Will recheck labs this week.         FOLLOW-UP: Schedule a follow-up appointment in 6 weeks.:.      FOLLOW-UP TESTING #1: FOLLOW-UP LABORATORY:  Labs to be scheduled in the future include CBC, Diabetes Panel 2;CMP, A1C, Lipid, Microalbumin:Creatinine Ratio, and TSH.            Orders:       FUTURE  Future order to be done at patients convenience  (Send-Out)            54187  BDCB - [facility] CBC with 3 part diff  (Send-Out)            26068  DIAB - [facility] CMP A1C LIPID AND MICRO ALBUM CR RATIO: [id number]  (Send-Out)            23692  [facility] - [facility] TSH  (Send-Out)            APPTO  Appointment need  (In-House)                  Patient Recommendations:        For  Type 2 diabetes mellitus with hyperglycemia:    Schedule a follow-up visit in 6 weeks.                APPOINTMENT INFORMATION:        Monday Tuesday Wednesday Thursday Friday Saturday Sunday            Time:___________________AM  PM   Date:_____________________         The following laboratory testing has been ordered: CBC TSH             Charge Capture:         Primary Diagnosis:     Z13.31  Encounter for screening for depression           Orders:       28839  Office visit - new pt, level 3  (In-House)              Depression screen negative  (In-House)              R03.0  Elevated blood-pressure reading, without diagnosis of hypertension     E11.65  Type 2 diabetes mellitus with hyperglycemia           Orders:      APPTO  Appointment need  (In-House)

## 2021-07-02 VITALS
HEIGHT: 73 IN | DIASTOLIC BLOOD PRESSURE: 84 MMHG | TEMPERATURE: 96.3 F | HEART RATE: 91 BPM | BODY MASS INDEX: 31.49 KG/M2 | SYSTOLIC BLOOD PRESSURE: 131 MMHG | WEIGHT: 237.6 LBS

## 2021-07-02 VITALS
HEIGHT: 73 IN | SYSTOLIC BLOOD PRESSURE: 129 MMHG | DIASTOLIC BLOOD PRESSURE: 77 MMHG | TEMPERATURE: 96.5 F | WEIGHT: 235.6 LBS | BODY MASS INDEX: 31.23 KG/M2 | HEART RATE: 90 BPM

## 2021-07-02 VITALS
BODY MASS INDEX: 30.91 KG/M2 | WEIGHT: 233.2 LBS | SYSTOLIC BLOOD PRESSURE: 148 MMHG | DIASTOLIC BLOOD PRESSURE: 84 MMHG | TEMPERATURE: 96.6 F | HEIGHT: 73 IN | HEART RATE: 102 BPM

## 2021-07-02 VITALS
BODY MASS INDEX: 30.93 KG/M2 | WEIGHT: 233.4 LBS | HEIGHT: 73 IN | TEMPERATURE: 97.3 F | SYSTOLIC BLOOD PRESSURE: 123 MMHG | HEART RATE: 99 BPM | DIASTOLIC BLOOD PRESSURE: 79 MMHG

## 2021-09-07 ENCOUNTER — OFFICE VISIT (OUTPATIENT)
Dept: FAMILY MEDICINE CLINIC | Age: 66
End: 2021-09-07

## 2021-09-07 VITALS
WEIGHT: 230.2 LBS | HEART RATE: 99 BPM | BODY MASS INDEX: 30.51 KG/M2 | TEMPERATURE: 97.3 F | HEIGHT: 73 IN | DIASTOLIC BLOOD PRESSURE: 70 MMHG | SYSTOLIC BLOOD PRESSURE: 120 MMHG

## 2021-09-07 DIAGNOSIS — R05.9 COUGH: Primary | ICD-10-CM

## 2021-09-07 DIAGNOSIS — J06.9 VIRAL UPPER RESPIRATORY TRACT INFECTION: ICD-10-CM

## 2021-09-07 LAB
EXPIRATION DATE: NORMAL
FLUAV AG UPPER RESP QL IA.RAPID: NOT DETECTED
FLUBV AG UPPER RESP QL IA.RAPID: NOT DETECTED
INTERNAL CONTROL: NORMAL
Lab: NORMAL
SARS-COV-2 AG UPPER RESP QL IA.RAPID: NOT DETECTED

## 2021-09-07 PROCEDURE — 87428 SARSCOV & INF VIR A&B AG IA: CPT | Performed by: FAMILY MEDICINE

## 2021-09-07 PROCEDURE — U0004 COV-19 TEST NON-CDC HGH THRU: HCPCS | Performed by: FAMILY MEDICINE

## 2021-09-07 PROCEDURE — 99213 OFFICE O/P EST LOW 20 MIN: CPT | Performed by: FAMILY MEDICINE

## 2021-09-07 PROCEDURE — C9803 HOPD COVID-19 SPEC COLLECT: HCPCS | Performed by: FAMILY MEDICINE

## 2021-09-07 RX ORDER — PREDNISONE 20 MG/1
TABLET ORAL
COMMUNITY
Start: 2021-06-27 | End: 2021-11-30

## 2021-09-07 RX ORDER — METFORMIN HYDROCHLORIDE 500 MG/1
500 TABLET, EXTENDED RELEASE ORAL DAILY
COMMUNITY
Start: 2021-08-28 | End: 2021-11-08

## 2021-09-07 RX ORDER — METOPROLOL SUCCINATE 25 MG/1
25 TABLET, EXTENDED RELEASE ORAL DAILY
COMMUNITY
Start: 2021-08-31 | End: 2021-11-29

## 2021-09-07 RX ORDER — GLIMEPIRIDE 2 MG/1
2 TABLET ORAL DAILY
COMMUNITY
Start: 2021-08-12 | End: 2021-11-09

## 2021-09-07 NOTE — PROGRESS NOTES
Kang Mantilla presents to [facility].    Chief Complaint: fever    Subjective       History of Present Illness:  CAROLYN Kapadia has acute symptoms of fatigue and low grade fever with headache (mild).  No body aches/sinus congestion or drainage.  No known covid exposure, no loss of taste or smell, no diarrhea.  On tylenol for fever    Review of Systems:  Review of Systems   Constitutional: Positive for chills, fatigue and fever.   HENT: Negative for congestion, ear discharge and sore throat.    Respiratory: Positive for cough. Negative for shortness of breath and wheezing.    Cardiovascular: Negative for chest pain.   Gastrointestinal: Negative for abdominal pain, constipation, diarrhea, nausea, vomiting and GERD.   Genitourinary: Negative for flank pain.   Musculoskeletal: Negative for myalgias.   Neurological: Positive for headache. Negative for dizziness.   Psychiatric/Behavioral: Negative for depressed mood.        Objective   Medical History:  No past medical history on file.  No past surgical history on file.   History reviewed. No pertinent family history.  Social History     Tobacco Use   • Smoking status: Never Smoker   • Smokeless tobacco: Never Used   Substance Use Topics   • Alcohol use: Not Currently       Health Maintenance Due   Topic Date Due   • COLORECTAL CANCER SCREENING  Never done   • TDAP/TD VACCINES (1 - Tdap) Never done   • ZOSTER VACCINE (1 of 2) Never done   • Pneumococcal Vaccine 65+ (1 of 1 - PPSV23) Never done   • HEPATITIS C SCREENING  Never done   • ANNUAL WELLNESS VISIT  Never done          There is no immunization history on file for this patient.    No Known Allergies     Medications:  Current Outpatient Medications on File Prior to Visit   Medication Sig   • glimepiride (AMARYL) 2 MG tablet Take 2 mg by mouth Daily.   • metFORMIN ER (GLUCOPHAGE-XR) 500 MG 24 hr tablet Take 500 mg by mouth Daily.   • metoprolol succinate XL (TOPROL-XL) 25 MG 24 hr tablet  "Take 25 mg by mouth Daily.   • predniSONE (DELTASONE) 20 MG tablet      No current facility-administered medications on file prior to visit.       Vital Signs:   /70   Pulse 99   Temp 97.3 °F (36.3 °C)   Ht 185.4 cm (72.99\")   Wt 104 kg (230 lb 3.2 oz)   BMI 30.38 kg/m²       Physical Exam:  Physical Exam  Vitals and nursing note reviewed.   Constitutional:       General: He is not in acute distress.     Appearance: Normal appearance. He is not ill-appearing, toxic-appearing or diaphoretic.   HENT:      Head: Normocephalic and atraumatic.      Right Ear: Tympanic membrane, ear canal and external ear normal.      Left Ear: Tympanic membrane, ear canal and external ear normal.      Nose: No congestion or rhinorrhea.      Mouth/Throat:      Pharynx: Oropharynx is clear. No oropharyngeal exudate or posterior oropharyngeal erythema.   Eyes:      Extraocular Movements: Extraocular movements intact.      Conjunctiva/sclera: Conjunctivae normal.      Pupils: Pupils are equal, round, and reactive to light.   Cardiovascular:      Rate and Rhythm: Normal rate and regular rhythm.      Heart sounds: Normal heart sounds.   Pulmonary:      Breath sounds: Normal breath sounds. No wheezing, rhonchi or rales.   Abdominal:      General: Abdomen is flat. Bowel sounds are normal.      Palpations: Abdomen is soft.   Musculoskeletal:      Cervical back: Neck supple. No rigidity.   Lymphadenopathy:      Cervical: No cervical adenopathy.   Skin:     General: Skin is warm and dry.      Capillary Refill: Capillary refill takes less than 2 seconds.   Neurological:      Mental Status: He is alert and oriented to person, place, and time.   Psychiatric:         Mood and Affect: Mood normal.         Behavior: Behavior normal.         Result Review      The following data was reviewed by María Sin MD on 09/07/2021.  Lab Results   Component Value Date    WBC 6.24 11/19/2020    HGB 16.10 11/19/2020    HCT 47.4 11/19/2020    MCV " 91.0 11/19/2020    .00 11/19/2020     Lab Results   Component Value Date    GLUCOSE 174 (H) 01/23/2015    BUN 17 04/06/2021    CREATININE 1.37 (H) 04/06/2021    BCR 12 04/06/2021    K 4.5 04/06/2021    CO2 25 04/06/2021    CALCIUM 9.4 04/06/2021    ALBUMIN 3.8 04/06/2021    LABIL2 1.1 (L) 04/06/2021    AST 25 04/06/2021    ALT 33 04/06/2021     Lab Results   Component Value Date    CHLPL 192 04/06/2021    TRIG 231 (H) 04/06/2021    HDL 43 04/06/2021     (H) 04/06/2021     Lab Results   Component Value Date    TSH 3.170 11/19/2020     Lab Results   Component Value Date    HGBA1C 7.5 (H) 04/06/2021     Lab Results   Component Value Date    PSA 0.43 11/19/2020                       Assessment and Plan:          Diagnoses and all orders for this visit:    1. Cough (Primary)  -     POCT SARS-CoV-2 Antigen EILEEN + Flu    2. Viral upper respiratory tract infection  -     COVID-19,  MAD/WING IN-HOUSE, NP SWAB IN TRANSPORT MEDIA 8-10 HR TAT - Swab, Nasopharynx; Future  -     COVID-19,  MAD/WING IN-HOUSE, NP SWAB IN TRANSPORT MEDIA 8-10 HR TAT - Swab, Nasopharynx    I think he has Covid.  We will go ahead and send the PCR test.  Patient advised that he can take Tylenol as needed fevers and headache.  He does not need a cough syrup at this time.  He is to watch for any changes in his symptoms including increasing shortness of air and go to the ER if this occurs.      Follow Up as needed no improvement or worsening symptoms

## 2021-09-09 LAB — SARS-COV-2 RNA NOSE QL NAA+PROBE: NOT DETECTED

## 2021-11-05 ENCOUNTER — TELEPHONE (OUTPATIENT)
Dept: FAMILY MEDICINE CLINIC | Age: 66
End: 2021-11-05

## 2021-11-05 DIAGNOSIS — I10 ESSENTIAL HYPERTENSION: ICD-10-CM

## 2021-11-05 DIAGNOSIS — N18.31 CHRONIC KIDNEY DISEASE, STAGE 3A: ICD-10-CM

## 2021-11-05 DIAGNOSIS — Z11.59 NEED FOR HEPATITIS C SCREENING TEST: ICD-10-CM

## 2021-11-05 DIAGNOSIS — Z12.5 SCREENING FOR PROSTATE CANCER: ICD-10-CM

## 2021-11-05 DIAGNOSIS — E11.65 TYPE 2 DIABETES MELLITUS WITH HYPERGLYCEMIA, WITHOUT LONG-TERM CURRENT USE OF INSULIN: Primary | ICD-10-CM

## 2021-11-05 NOTE — TELEPHONE ENCOUNTER
Caller: DOLORES CLAY    Relationship: Emergency Contact    Best call back number: [phone]    What was the call regarding: PATIENTS WIFE WAS CALLING IN TO ASK IF THE PATIENT NEEDED BLOODWORK BEFORE HIS APPOINTMENT ON THE 30TH     Do you require a callback: IF NEEDED.

## 2021-11-07 NOTE — TELEPHONE ENCOUNTER
I have placed orders for fasting blood work.  This should be done the week of 11/22/2021.  Thanks.

## 2021-11-08 RX ORDER — METFORMIN HYDROCHLORIDE 500 MG/1
TABLET, EXTENDED RELEASE ORAL
Qty: 360 TABLET | Refills: 0 | Status: SHIPPED | OUTPATIENT
Start: 2021-11-08 | End: 2021-11-30 | Stop reason: SDUPTHER

## 2021-11-09 RX ORDER — GLIMEPIRIDE 2 MG/1
TABLET ORAL
Qty: 90 TABLET | Refills: 0 | Status: SHIPPED | OUTPATIENT
Start: 2021-11-09 | End: 2021-11-30 | Stop reason: SDUPTHER

## 2021-11-23 ENCOUNTER — LAB (OUTPATIENT)
Dept: LAB | Facility: HOSPITAL | Age: 66
End: 2021-11-23

## 2021-11-23 DIAGNOSIS — Z11.59 NEED FOR HEPATITIS C SCREENING TEST: ICD-10-CM

## 2021-11-23 DIAGNOSIS — E11.65 TYPE 2 DIABETES MELLITUS WITH HYPERGLYCEMIA, WITHOUT LONG-TERM CURRENT USE OF INSULIN (HCC): ICD-10-CM

## 2021-11-23 DIAGNOSIS — N18.31 CHRONIC KIDNEY DISEASE, STAGE 3A (HCC): ICD-10-CM

## 2021-11-23 DIAGNOSIS — I10 ESSENTIAL HYPERTENSION: ICD-10-CM

## 2021-11-23 DIAGNOSIS — Z12.5 SCREENING FOR PROSTATE CANCER: ICD-10-CM

## 2021-11-23 LAB
ALBUMIN SERPL-MCNC: 4.4 G/DL (ref 3.5–5.2)
ALBUMIN UR-MCNC: 1.4 MG/DL
ALBUMIN/GLOB SERPL: 1.3 G/DL
ALP SERPL-CCNC: 78 U/L (ref 39–117)
ALT SERPL W P-5'-P-CCNC: 23 U/L (ref 1–41)
ANION GAP SERPL CALCULATED.3IONS-SCNC: 7.7 MMOL/L (ref 5–15)
AST SERPL-CCNC: 21 U/L (ref 1–40)
BILIRUB SERPL-MCNC: 0.7 MG/DL (ref 0–1.2)
BUN SERPL-MCNC: 19 MG/DL (ref 8–23)
BUN/CREAT SERPL: 17.3 (ref 7–25)
CALCIUM SPEC-SCNC: 9.5 MG/DL (ref 8.6–10.5)
CHLORIDE SERPL-SCNC: 99 MMOL/L (ref 98–107)
CHOLEST SERPL-MCNC: 213 MG/DL (ref 0–200)
CO2 SERPL-SCNC: 29.3 MMOL/L (ref 22–29)
CREAT SERPL-MCNC: 1.1 MG/DL (ref 0.76–1.27)
DEPRECATED RDW RBC AUTO: 43.1 FL (ref 37–54)
ERYTHROCYTE [DISTWIDTH] IN BLOOD BY AUTOMATED COUNT: 12.9 % (ref 12.3–15.4)
GFR SERPL CREATININE-BSD FRML MDRD: 67 ML/MIN/1.73
GLOBULIN UR ELPH-MCNC: 3.3 GM/DL
GLUCOSE SERPL-MCNC: 187 MG/DL (ref 65–99)
HBA1C MFR BLD: 7.71 % (ref 4.8–5.6)
HCT VFR BLD AUTO: 50.8 % (ref 37.5–51)
HCV AB SER DONR QL: NORMAL
HDLC SERPL-MCNC: 42 MG/DL (ref 40–60)
HGB BLD-MCNC: 17.2 G/DL (ref 13–17.7)
LDLC SERPL CALC-MCNC: 131 MG/DL (ref 0–100)
LDLC/HDLC SERPL: 2.99 {RATIO}
MCH RBC QN AUTO: 31 PG (ref 26.6–33)
MCHC RBC AUTO-ENTMCNC: 33.9 G/DL (ref 31.5–35.7)
MCV RBC AUTO: 91.5 FL (ref 79–97)
PLATELET # BLD AUTO: 233 10*3/MM3 (ref 140–450)
PMV BLD AUTO: 10.6 FL (ref 6–12)
POTASSIUM SERPL-SCNC: 4.2 MMOL/L (ref 3.5–5.2)
PROT SERPL-MCNC: 7.7 G/DL (ref 6–8.5)
PSA SERPL-MCNC: 0.68 NG/ML (ref 0–4)
RBC # BLD AUTO: 5.55 10*6/MM3 (ref 4.14–5.8)
SODIUM SERPL-SCNC: 136 MMOL/L (ref 136–145)
TRIGL SERPL-MCNC: 227 MG/DL (ref 0–150)
VLDLC SERPL-MCNC: 40 MG/DL (ref 5–40)
WBC NRBC COR # BLD: 6.8 10*3/MM3 (ref 3.4–10.8)

## 2021-11-23 PROCEDURE — 36415 COLL VENOUS BLD VENIPUNCTURE: CPT

## 2021-11-23 PROCEDURE — G0103 PSA SCREENING: HCPCS

## 2021-11-23 PROCEDURE — 86803 HEPATITIS C AB TEST: CPT

## 2021-11-23 PROCEDURE — 85027 COMPLETE CBC AUTOMATED: CPT

## 2021-11-23 PROCEDURE — 80061 LIPID PANEL: CPT

## 2021-11-23 PROCEDURE — 80053 COMPREHEN METABOLIC PANEL: CPT

## 2021-11-23 PROCEDURE — 83036 HEMOGLOBIN GLYCOSYLATED A1C: CPT

## 2021-11-23 PROCEDURE — 82043 UR ALBUMIN QUANTITATIVE: CPT

## 2021-11-29 RX ORDER — METOPROLOL SUCCINATE 25 MG/1
TABLET, EXTENDED RELEASE ORAL
Qty: 90 TABLET | Refills: 0 | Status: SHIPPED | OUTPATIENT
Start: 2021-11-29 | End: 2021-11-30 | Stop reason: SDUPTHER

## 2021-11-30 ENCOUNTER — OFFICE VISIT (OUTPATIENT)
Dept: FAMILY MEDICINE CLINIC | Age: 66
End: 2021-11-30

## 2021-11-30 VITALS
BODY MASS INDEX: 30.62 KG/M2 | SYSTOLIC BLOOD PRESSURE: 135 MMHG | HEART RATE: 83 BPM | HEIGHT: 73 IN | WEIGHT: 231 LBS | DIASTOLIC BLOOD PRESSURE: 75 MMHG | TEMPERATURE: 98.1 F

## 2021-11-30 DIAGNOSIS — I10 ESSENTIAL HYPERTENSION: ICD-10-CM

## 2021-11-30 DIAGNOSIS — E78.2 MIXED HYPERLIPIDEMIA: ICD-10-CM

## 2021-11-30 DIAGNOSIS — N18.31 CHRONIC KIDNEY DISEASE, STAGE 3A (HCC): ICD-10-CM

## 2021-11-30 DIAGNOSIS — E11.65 TYPE 2 DIABETES MELLITUS WITH HYPERGLYCEMIA, WITHOUT LONG-TERM CURRENT USE OF INSULIN (HCC): Primary | ICD-10-CM

## 2021-11-30 PROCEDURE — 99214 OFFICE O/P EST MOD 30 MIN: CPT | Performed by: FAMILY MEDICINE

## 2021-11-30 RX ORDER — GLIMEPIRIDE 2 MG/1
2 TABLET ORAL DAILY
Qty: 90 TABLET | Refills: 1 | Status: SHIPPED | OUTPATIENT
Start: 2021-11-30 | End: 2022-06-13 | Stop reason: SDUPTHER

## 2021-11-30 RX ORDER — METOPROLOL SUCCINATE 25 MG/1
25 TABLET, EXTENDED RELEASE ORAL DAILY
Qty: 90 TABLET | Refills: 1 | Status: SHIPPED | OUTPATIENT
Start: 2021-11-30 | End: 2022-06-13 | Stop reason: SDUPTHER

## 2021-11-30 RX ORDER — METFORMIN HYDROCHLORIDE 500 MG/1
1000 TABLET, EXTENDED RELEASE ORAL
Qty: 180 TABLET | Refills: 1 | Status: SHIPPED | OUTPATIENT
Start: 2021-11-30 | End: 2022-06-13 | Stop reason: SDUPTHER

## 2021-11-30 NOTE — PROGRESS NOTES
Kang Mantilla presents to [facility].    Chief Complaint:  Diabetes follow up    Subjective       History of Present Illness:  Kang is in today for follow-up on type 2 diabetes.  He is currently taking Metformin and glimepiride for this.  He says the morning sugar tends to run around 180.  His A1c came back at 7.7%.  This is consistent where it has been for about the last year.  He denies any family history of medullary thyroid cancer or multiple endocrine neoplasia.  He has not previously been on injection therapy for diabetes.    Kang also has a history of hypertension for which she continues to take metoprolol.  His blood pressure is well controlled today.  He did have some issue with lisinopril probably causing cough in the past.  He also has not been able to tolerate statin therapy due to side effects.      Review of Systems:  Review of Systems   Constitutional: Negative for chills and fever.   Respiratory: Negative for cough and shortness of breath.    Cardiovascular: Negative for chest pain and palpitations.   Gastrointestinal: Negative for abdominal pain, nausea and vomiting.        Objective   Medical History:  Past Medical History:   • Chronic kidney disease, stage 3a (HCC)   • Essential hypertension   • Type 2 diabetes mellitus with hyperglycemia (HCC)     Past Surgical History:   • JOINT REPLACEMENT    Right knee      History reviewed. No pertinent family history.  Social History     Tobacco Use   • Smoking status: Never Smoker   • Smokeless tobacco: Never Used   Substance Use Topics   • Alcohol use: Yes     Alcohol/week: 12.0 standard drinks     Types: 12 Cans of beer per week       Health Maintenance Due   Topic Date Due   • COLORECTAL CANCER SCREENING  Never done   • TDAP/TD VACCINES (1 - Tdap) Never done   • ZOSTER VACCINE (1 of 2) Never done   • ANNUAL WELLNESS VISIT  09/24/2021   • COVID-19 Vaccine (3 - Booster for Moderna series) 10/02/2021        Immunization  "History   Administered Date(s) Administered   • COVID-19 (MODERNA) 1st, 2nd, 3rd Dose Only 03/04/2021, 04/02/2021   • Flucelvax Quad Vial =>4yrs 10/01/2021   • Influenza TIV (IM) 11/03/2019   • Influenza, Unspecified 10/14/2021       Allergies   Allergen Reactions   • Atorvastatin Other (See Comments)     weakness   • Lisinopril Cough        Medications:  Current Outpatient Medications on File Prior to Visit   Medication Sig   • [DISCONTINUED] glimepiride (AMARYL) 2 MG tablet TAKE 1 TABLET DAILY   • [DISCONTINUED] metFORMIN ER (GLUCOPHAGE-XR) 500 MG 24 hr tablet TAKE 2 TABLETS TWICE A DAY   • [DISCONTINUED] metoprolol succinate XL (TOPROL-XL) 25 MG 24 hr tablet TAKE 1 TABLET DAILY   • predniSONE (DELTASONE) 20 MG tablet      No current facility-administered medications on file prior to visit.       Vital Signs:   /75 (BP Location: Left arm, Patient Position: Sitting, Cuff Size: Large Adult)   Pulse 83   Temp 98.1 °F (36.7 °C) (Oral)   Ht 185.4 cm (72.99\")   Wt 105 kg (231 lb)   BMI 30.48 kg/m²       Physical Exam:  Physical Exam  Vitals reviewed.   Constitutional:       General: He is not in acute distress.     Appearance: He is obese. He is not ill-appearing.   Eyes:      Pupils: Pupils are equal, round, and reactive to light.   Neck:      Comments: No thyromegaly  Cardiovascular:      Rate and Rhythm: Normal rate and regular rhythm.   Pulmonary:      Effort: Pulmonary effort is normal.      Breath sounds: Normal breath sounds.   Abdominal:      General: There is no distension.      Palpations: Abdomen is soft.      Tenderness: There is no abdominal tenderness.   Musculoskeletal:      Cervical back: Neck supple.   Lymphadenopathy:      Cervical: No cervical adenopathy.   Skin:     Findings: No lesion or rash.   Neurological:      Mental Status: He is alert.         Result Review      The following data was reviewed by Maveirck Robins MD on 11/30/2021.  Lab Results   Component Value Date    WBC " 6.80 11/23/2021    HGB 17.2 11/23/2021    HCT 50.8 11/23/2021    MCV 91.5 11/23/2021     11/23/2021     Lab Results   Component Value Date    GLUCOSE 187 (H) 11/23/2021    BUN 19 11/23/2021    CREATININE 1.10 11/23/2021     11/23/2021    K 4.2 11/23/2021    CL 99 11/23/2021    CO2 29.3 (H) 11/23/2021    CALCIUM 9.5 11/23/2021    PROTEINTOT 7.7 11/23/2021    ALBUMIN 4.40 11/23/2021    ALT 23 11/23/2021    AST 21 11/23/2021    ALKPHOS 78 11/23/2021    BILITOT 0.7 11/23/2021    EGFRIFNONA 67 11/23/2021    GLOB 3.3 11/23/2021    AGRATIO 1.3 11/23/2021    BCR 17.3 11/23/2021    ANIONGAP 7.7 11/23/2021      Lab Results   Component Value Date    CHOL 213 (H) 11/23/2021    CHLPL 192 04/06/2021    TRIG 227 (H) 11/23/2021    HDL 42 11/23/2021     (H) 11/23/2021     Lab Results   Component Value Date    TSH 3.170 11/19/2020     Lab Results   Component Value Date    HGBA1C 7.71 (H) 11/23/2021     Lab Results   Component Value Date    PSA 0.683 11/23/2021    PSA 0.43 11/19/2020            Assessment and Plan:   Today, we have reviewed Kang's care.  He is doing well for the most part, but his A1c continues to be elevated.  We will add Jardiance 10 mg daily to his regimen.  Risks are reviewed with him including the potential for significant kidney or skin infection.  He will watch out for those.  Risk of hypoglycemia is also discussed.  Otherwise, we will refill his needed medications.  He declines statin therapy given previous intolerance.  I recommended he move ahead with colonoscopy at this time.  Risk for recurrent polyp or even rarely colon cancer is reviewed.  He wants to hold off at least for now.  We will schedule 6-month follow-up.       Diagnoses and all orders for this visit:    1. Type 2 diabetes mellitus with hyperglycemia, without long-term current use of insulin (HCC) (Primary)  -     empagliflozin (JARDIANCE) 10 MG tablet tablet; Take 1 tablet by mouth Daily.  Dispense: 30 tablet; Refill: 1  -      glimepiride (AMARYL) 2 MG tablet; Take 1 tablet by mouth Daily.  Dispense: 90 tablet; Refill: 1  -     metFORMIN ER (GLUCOPHAGE-XR) 500 MG 24 hr tablet; Take 2 tablets by mouth Daily With Breakfast.  Dispense: 180 tablet; Refill: 1    2. Essential hypertension  -     metoprolol succinate XL (TOPROL-XL) 25 MG 24 hr tablet; Take 1 tablet by mouth Daily.  Dispense: 90 tablet; Refill: 1    3. Chronic kidney disease, stage 3a (HCC)  -     metoprolol succinate XL (TOPROL-XL) 25 MG 24 hr tablet; Take 1 tablet by mouth Daily.  Dispense: 90 tablet; Refill: 1    4. Mixed hyperlipidemia          Follow Up   Return in about 6 months (around 5/30/2022).  Patient was given instructions and counseling regarding his condition or for health maintenance advice. Please see specific information pulled into the AVS if appropriate.

## 2021-11-30 NOTE — PATIENT INSTRUCTIONS
Diabetes Mellitus Basics    Diabetes mellitus, or diabetes, is a long-term (chronic) disease. It occurs when the body does not properly use sugar (glucose) that is released from food after you eat.  Diabetes mellitus may be caused by one or both of these problems:  · Your pancreas does not make enough of a hormone called insulin.  · Your body does not react in a normal way to the insulin that it makes.  Insulin lets glucose enter cells in your body. This gives you energy. If you have diabetes, glucose cannot get into cells. This causes high blood glucose (hyperglycemia).  How to treat and manage diabetes  You may need to take insulin or other diabetes medicines daily to keep your glucose in balance. If you are prescribed insulin, you will learn how to give yourself insulin by injection. You may need to adjust the amount of insulin you take based on the foods that you eat.  You will need to check your blood glucose levels using a glucose monitor as told by your health care provider. The readings can help determine if you have low or high blood glucose.  Generally, you should have these blood glucose levels:  · Before meals (preprandial):  mg/dL (4.4-7.2 mmol/L).  · After meals (postprandial): below 180 mg/dL (10 mmol/L).  · Hemoglobin A1c (HbA1c) level: less than 7%.  Your health care provider will set treatment goals for you.  Keep all follow-up visits. This is important.  Follow these instructions at home:  Diabetes medicines  Take your diabetes medicines every day as told by your health care provider. List your diabetes medicines here:  · Name of medicine: ______________________________  ? Amount (dose): _______________ Time (a.m./p.m.): _______________ Notes: ___________________________________  · Name of medicine: ______________________________  ? Amount (dose): _______________ Time (a.m./p.m.): _______________ Notes: ___________________________________  · Name of medicine:  ______________________________  ? Amount (dose): _______________ Time (a.m./p.m.): _______________ Notes: ___________________________________  Insulin  If you use insulin, list the types of insulin you use here:  · Insulin type: ______________________________  ? Amount (dose): _______________ Time (a.m./p.m.): _______________Notes: ___________________________________  · Insulin type: ______________________________  ? Amount (dose): _______________ Time (a.m./p.m.): _______________ Notes: ___________________________________  · Insulin type: ______________________________  ? Amount (dose): _______________ Time (a.m./p.m.): _______________ Notes: ___________________________________  · Insulin type: ______________________________  ? Amount (dose): _______________ Time (a.m./p.m.): _______________ Notes: ___________________________________  · Insulin type: ______________________________  ? Amount (dose): _______________ Time (a.m./p.m.): _______________ Notes: ___________________________________  Managing blood glucose    Check your blood glucose levels using a glucose monitor as told by your health care provider.  Write down the times that you check your glucose levels here:  · Time: _______________ Notes: ___________________________________  · Time: _______________ Notes: ___________________________________  · Time: _______________ Notes: ___________________________________  · Time: _______________ Notes: ___________________________________  · Time: _______________ Notes: ___________________________________  · Time: _______________ Notes: ___________________________________    Low blood glucose  Low blood glucose (hypoglycemia) is when glucose is at or below 70 mg/dL (3.9 mmol/L). Symptoms may include:  · Feeling:  ? Hungry.  ? Sweaty and clammy.  ? Irritable or easily upset.  ? Dizzy.  ? Sleepy.  · Having:  ? A fast heartbeat.  ? A headache.  ? A change in your vision.  ? Numbness around the mouth, lips, or  tongue.  · Having trouble with:  ? Moving (coordination).  ? Sleeping.  Treating low blood glucose  To treat low blood glucose, eat or drink something containing sugar right away. If you can think clearly and swallow safely, follow the 15:15 rule:  · Take 15 grams of a fast-acting carb (carbohydrate), as told by your health care provider.  · Some fast-acting carbs are:  ? Glucose tablets: take 3-4 tablets.  ? Hard candy: eat 3-5 pieces.  ? Fruit juice: drink 4 oz (120 mL).  ? Regular (not diet) soda: drink 4-6 oz (120-180 mL).  ? Honey or sugar: eat 1 Tbsp (15 mL).  · Check your blood glucose levels 15 minutes after you take the carb.  · If your glucose is still at or below 70 mg/dL (3.9 mmol/L), take 15 grams of a carb again.  · If your glucose does not go above 70 mg/dL (3.9 mmol/L) after 3 tries, get help right away.  · After your glucose goes back to normal, eat a meal or a snack within 1 hour.  Treating very low blood glucose  If your glucose is at or below 54 mg/dL (3 mmol/L), you have very low blood glucose (severe hypoglycemia).  This is an emergency. Do not wait to see if the symptoms will go away. Get medical help right away. Call your local emergency services (911 in the [place] Do not drive yourself to the hospital.  Questions to ask your health care provider  · Should I talk with a diabetes educator?  · What equipment will I need to care for myself at home?  · What diabetes medicines do I need? When should I take them?  · How often do I need to check my blood glucose levels?  · What number can I call if I have questions?  · When is my follow-up visit?  · Where can I find a support group for people with diabetes?  Where to find more information  · American Diabetes Association: www.diabetes.org  · Association of Diabetes Care and Education Specialists: www.diabeteseducator.org  Contact a health care provider if:  · Your blood glucose is at or above 240 mg/dL (13.3 mmol/L) for 2 days in a row.  · You have  been sick or have had a fever for 2 days or more, and you are not getting better.  · You have any of these problems for more than 6 hours:  ? You cannot eat or drink.  ? You feel nauseous.  ? You vomit.  ? You have diarrhea.  Get help right away if:  · Your blood glucose is lower than 54 mg/dL (3 mmol/L).  · You get confused.  · You have trouble thinking clearly.  · You have trouble breathing.  These symptoms may represent a serious problem that is an emergency. Do not wait to see if the symptoms will go away. Get medical help right away. Call your local emergency services (911 in the [place] Do not drive yourself to the hospital.  Summary  · Diabetes mellitus is a chronic disease that occurs when the body does not properly use sugar (glucose) that is released from food after you eat.  · Take insulin and diabetes medicines as told.  · Check your blood glucose every day, as often as told.  · Keep all follow-up visits. This is important.  This information is not intended to replace advice given to you by your health care provider. Make sure you discuss any questions you have with your health care provider.  Document Revised: 04/20/2021 Document Reviewed: 04/20/2021  [employer] Patient Education © 2021 [employer]

## 2021-12-14 DIAGNOSIS — E11.65 TYPE 2 DIABETES MELLITUS WITH HYPERGLYCEMIA, WITHOUT LONG-TERM CURRENT USE OF INSULIN (HCC): ICD-10-CM

## 2021-12-14 NOTE — TELEPHONE ENCOUNTER
----- Message from Niecy Pryor LPN sent at 11/30/2021  9:50 AM EST -----  TICKLE to call him in 2 weeks and see if he is tolerating Jardiance.  How her sugar is doing.

## 2021-12-15 NOTE — TELEPHONE ENCOUNTER
Noted.  I have sent the 25 mg dose to replace the current 10 mg dose.  25 mg is the most typically use dose.  Thanks.

## 2022-02-21 ENCOUNTER — PREP FOR SURGERY (OUTPATIENT)
Dept: OTHER | Facility: HOSPITAL | Age: 67
End: 2022-02-21

## 2022-02-21 DIAGNOSIS — Z86.010 HISTORY OF COLON POLYPS: Primary | ICD-10-CM

## 2022-02-24 PROBLEM — Z86.010 HISTORY OF COLON POLYPS: Status: ACTIVE | Noted: 2022-02-24

## 2022-02-24 PROBLEM — Z86.0100 HISTORY OF COLON POLYPS: Status: ACTIVE | Noted: 2022-02-24

## 2022-03-01 ENCOUNTER — TELEPHONE (OUTPATIENT)
Dept: FAMILY MEDICINE CLINIC | Age: 67
End: 2022-03-01

## 2022-03-01 NOTE — TELEPHONE ENCOUNTER
----- Message from Niecy Pryor LPN sent at 11/30/2021  9:50 AM EST -----  TICKLE for in-house fingerstick A1c 90 days

## 2022-03-03 ENCOUNTER — CLINICAL SUPPORT (OUTPATIENT)
Dept: FAMILY MEDICINE CLINIC | Age: 67
End: 2022-03-03

## 2022-03-03 DIAGNOSIS — E11.65 TYPE 2 DIABETES MELLITUS WITH HYPERGLYCEMIA, WITHOUT LONG-TERM CURRENT USE OF INSULIN: Primary | ICD-10-CM

## 2022-03-03 LAB
EXPIRATION DATE: NORMAL
HBA1C MFR BLD: 7.2 %
Lab: NORMAL

## 2022-03-03 PROCEDURE — 3051F HG A1C>EQUAL 7.0%<8.0%: CPT | Performed by: FAMILY MEDICINE

## 2022-03-03 PROCEDURE — 83036 HEMOGLOBIN GLYCOSYLATED A1C: CPT | Performed by: FAMILY MEDICINE

## 2022-05-23 DIAGNOSIS — E11.65 TYPE 2 DIABETES MELLITUS WITH HYPERGLYCEMIA, WITHOUT LONG-TERM CURRENT USE OF INSULIN: ICD-10-CM

## 2022-05-25 ENCOUNTER — TELEPHONE (OUTPATIENT)
Dept: FAMILY MEDICINE CLINIC | Age: 67
End: 2022-05-25

## 2022-05-25 DIAGNOSIS — N18.31 CHRONIC KIDNEY DISEASE, STAGE 3A: ICD-10-CM

## 2022-05-25 DIAGNOSIS — I10 ESSENTIAL HYPERTENSION: ICD-10-CM

## 2022-05-25 DIAGNOSIS — E11.65 TYPE 2 DIABETES MELLITUS WITH HYPERGLYCEMIA, WITHOUT LONG-TERM CURRENT USE OF INSULIN: Primary | ICD-10-CM

## 2022-05-25 DIAGNOSIS — E78.2 MIXED HYPERLIPIDEMIA: ICD-10-CM

## 2022-05-25 NOTE — TELEPHONE ENCOUNTER
I have placed orders and would recommend he have fasting labs the week of 6/6/2022.  We last did an A1c on 3/3/2022, and I would like it to be a full 3 months from that time.  He should be able to come anytime that week.  Let me know if he has other concerns.  Thanks.

## 2022-05-25 NOTE — TELEPHONE ENCOUNTER
Patient is rescheduled to 06/13/2022 and is wanting to know if he needs to come in prior for labs? Please advise

## 2022-05-31 PROBLEM — K63.5 COLON POLYPS: Status: ACTIVE | Noted: 2018-02-13

## 2022-06-06 ENCOUNTER — LAB (OUTPATIENT)
Dept: LAB | Facility: HOSPITAL | Age: 67
End: 2022-06-06

## 2022-06-06 DIAGNOSIS — E78.2 MIXED HYPERLIPIDEMIA: ICD-10-CM

## 2022-06-06 DIAGNOSIS — I10 ESSENTIAL HYPERTENSION: ICD-10-CM

## 2022-06-06 DIAGNOSIS — N18.31 CHRONIC KIDNEY DISEASE, STAGE 3A: ICD-10-CM

## 2022-06-06 DIAGNOSIS — E11.65 TYPE 2 DIABETES MELLITUS WITH HYPERGLYCEMIA, WITHOUT LONG-TERM CURRENT USE OF INSULIN: ICD-10-CM

## 2022-06-06 LAB
ALBUMIN SERPL-MCNC: 4.5 G/DL (ref 3.5–5.2)
ALBUMIN/GLOB SERPL: 1.6 G/DL
ALP SERPL-CCNC: 72 U/L (ref 39–117)
ALT SERPL W P-5'-P-CCNC: 26 U/L (ref 1–41)
ANION GAP SERPL CALCULATED.3IONS-SCNC: 15 MMOL/L (ref 5–15)
AST SERPL-CCNC: 22 U/L (ref 1–40)
BILIRUB SERPL-MCNC: 0.5 MG/DL (ref 0–1.2)
BUN SERPL-MCNC: 13 MG/DL (ref 8–23)
BUN/CREAT SERPL: 11.4 (ref 7–25)
CALCIUM SPEC-SCNC: 9.4 MG/DL (ref 8.6–10.5)
CHLORIDE SERPL-SCNC: 98 MMOL/L (ref 98–107)
CHOLEST SERPL-MCNC: 201 MG/DL (ref 0–200)
CO2 SERPL-SCNC: 24 MMOL/L (ref 22–29)
CREAT SERPL-MCNC: 1.14 MG/DL (ref 0.76–1.27)
EGFRCR SERPLBLD CKD-EPI 2021: 70.9 ML/MIN/1.73
GLOBULIN UR ELPH-MCNC: 2.8 GM/DL
GLUCOSE SERPL-MCNC: 129 MG/DL (ref 65–99)
HBA1C MFR BLD: 7.3 % (ref 4.8–5.6)
HDLC SERPL-MCNC: 49 MG/DL (ref 40–60)
LDLC SERPL CALC-MCNC: 119 MG/DL (ref 0–100)
LDLC/HDLC SERPL: 2.34 {RATIO}
POTASSIUM SERPL-SCNC: 4.2 MMOL/L (ref 3.5–5.2)
PROT SERPL-MCNC: 7.3 G/DL (ref 6–8.5)
SODIUM SERPL-SCNC: 137 MMOL/L (ref 136–145)
TRIGL SERPL-MCNC: 186 MG/DL (ref 0–150)
TSH SERPL DL<=0.05 MIU/L-ACNC: 2.85 UIU/ML (ref 0.27–4.2)
VLDLC SERPL-MCNC: 33 MG/DL (ref 5–40)

## 2022-06-06 PROCEDURE — 84443 ASSAY THYROID STIM HORMONE: CPT

## 2022-06-06 PROCEDURE — 36415 COLL VENOUS BLD VENIPUNCTURE: CPT

## 2022-06-06 PROCEDURE — 80053 COMPREHEN METABOLIC PANEL: CPT

## 2022-06-06 PROCEDURE — 83036 HEMOGLOBIN GLYCOSYLATED A1C: CPT

## 2022-06-06 PROCEDURE — 80061 LIPID PANEL: CPT

## 2022-06-08 ENCOUNTER — HOSPITAL ENCOUNTER (OUTPATIENT)
Facility: HOSPITAL | Age: 67
Setting detail: HOSPITAL OUTPATIENT SURGERY
Discharge: HOME | End: 2022-06-08
Attending: COLON & RECTAL SURGERY | Admitting: COLON & RECTAL SURGERY

## 2022-06-08 ENCOUNTER — ANESTHESIA EVENT (OUTPATIENT)
Dept: GASTROENTEROLOGY | Facility: HOSPITAL | Age: 67
End: 2022-06-08

## 2022-06-08 ENCOUNTER — ANESTHESIA (OUTPATIENT)
Dept: GASTROENTEROLOGY | Facility: HOSPITAL | Age: 67
End: 2022-06-08

## 2022-06-08 VITALS
WEIGHT: 221 LBS | SYSTOLIC BLOOD PRESSURE: 122 MMHG | OXYGEN SATURATION: 95 % | RESPIRATION RATE: 16 BRPM | HEIGHT: 73 IN | BODY MASS INDEX: 29.29 KG/M2 | HEART RATE: 84 BPM | DIASTOLIC BLOOD PRESSURE: 79 MMHG

## 2022-06-08 DIAGNOSIS — Z86.010 HISTORY OF COLON POLYPS: ICD-10-CM

## 2022-06-08 LAB — GLUCOSE BLDC GLUCOMTR-MCNC: 194 MG/DL (ref 70–130)

## 2022-06-08 PROCEDURE — 25010000002 PROPOFOL 10 MG/ML EMULSION: Performed by: NURSE ANESTHETIST, CERTIFIED REGISTERED

## 2022-06-08 PROCEDURE — 88305 TISSUE EXAM BY PATHOLOGIST: CPT | Performed by: COLON & RECTAL SURGERY

## 2022-06-08 PROCEDURE — 82962 GLUCOSE BLOOD TEST: CPT

## 2022-06-08 RX ORDER — PROPOFOL 10 MG/ML
VIAL (ML) INTRAVENOUS AS NEEDED
Status: DISCONTINUED | OUTPATIENT
Start: 2022-06-08 | End: 2022-06-08 | Stop reason: SURG

## 2022-06-08 RX ORDER — SODIUM CHLORIDE 0.9 % (FLUSH) 0.9 %
10 SYRINGE (ML) INJECTION AS NEEDED
Status: DISCONTINUED | OUTPATIENT
Start: 2022-06-08 | End: 2022-06-08 | Stop reason: HOSPADM

## 2022-06-08 RX ORDER — SODIUM CHLORIDE, SODIUM LACTATE, POTASSIUM CHLORIDE, CALCIUM CHLORIDE 600; 310; 30; 20 MG/100ML; MG/100ML; MG/100ML; MG/100ML
30 INJECTION, SOLUTION INTRAVENOUS CONTINUOUS PRN
Status: DISCONTINUED | OUTPATIENT
Start: 2022-06-08 | End: 2022-06-08 | Stop reason: HOSPADM

## 2022-06-08 RX ORDER — LIDOCAINE HYDROCHLORIDE 20 MG/ML
INJECTION, SOLUTION INFILTRATION; PERINEURAL AS NEEDED
Status: DISCONTINUED | OUTPATIENT
Start: 2022-06-08 | End: 2022-06-08 | Stop reason: SURG

## 2022-06-08 RX ORDER — PROPOFOL 10 MG/ML
VIAL (ML) INTRAVENOUS CONTINUOUS PRN
Status: DISCONTINUED | OUTPATIENT
Start: 2022-06-08 | End: 2022-06-08 | Stop reason: SURG

## 2022-06-08 RX ORDER — SODIUM CHLORIDE, SODIUM LACTATE, POTASSIUM CHLORIDE, CALCIUM CHLORIDE 600; 310; 30; 20 MG/100ML; MG/100ML; MG/100ML; MG/100ML
INJECTION, SOLUTION INTRAVENOUS CONTINUOUS PRN
Status: DISCONTINUED | OUTPATIENT
Start: 2022-06-08 | End: 2022-06-08 | Stop reason: SURG

## 2022-06-08 RX ORDER — SODIUM CHLORIDE 0.9 % (FLUSH) 0.9 %
10 SYRINGE (ML) INJECTION EVERY 12 HOURS SCHEDULED
Status: DISCONTINUED | OUTPATIENT
Start: 2022-06-08 | End: 2022-06-08 | Stop reason: HOSPADM

## 2022-06-08 RX ADMIN — SODIUM CHLORIDE, POTASSIUM CHLORIDE, SODIUM LACTATE AND CALCIUM CHLORIDE 30 ML/HR: 600; 310; 30; 20 INJECTION, SOLUTION INTRAVENOUS at 06:54

## 2022-06-08 RX ADMIN — Medication 300 MCG/KG/MIN: at 07:34

## 2022-06-08 RX ADMIN — PROPOFOL 100 MG: 10 INJECTION, EMULSION INTRAVENOUS at 07:34

## 2022-06-08 RX ADMIN — LIDOCAINE HYDROCHLORIDE 60 MG: 20 INJECTION, SOLUTION INFILTRATION; PERINEURAL at 07:34

## 2022-06-08 RX ADMIN — SODIUM CHLORIDE, POTASSIUM CHLORIDE, SODIUM LACTATE AND CALCIUM CHLORIDE: 600; 310; 30; 20 INJECTION, SOLUTION INTRAVENOUS at 06:40

## 2022-06-08 NOTE — DISCHARGE INSTRUCTIONS
For the next 24 hours patient needs to be with a responsible adult.    For 24 hours DO NOT drive, operate machinery, appliances, drink alcohol, make important decisions or sign legal documents.    Start with a light or bland diet if you are feeling sick to your stomach otherwise advance to regular diet as tolerated.    Follow recommendations on procedure report if provided by your doctor.    Call Dr Graves for problems [phone]. Await pathology result in 7-10 days.    Problems may include but not limited to: large amounts of bleeding, trouble breathing, repeated vomiting, severe unrelieved pain, fever or chills.

## 2022-06-08 NOTE — ANESTHESIA POSTPROCEDURE EVALUATION
"Patient: Kang Mantilla    Procedure Summary     Date: 06/08/22 Room / Location: [facility] ENDOSCOPY 9 /  [facility]    Anesthesia Start: 0730 Anesthesia Stop: 0808    Procedure: COLONOSCOPY to cecum with hot snare polypectomies (N/A ) Diagnosis:       History of colon polyps      (History of colon polyps [Z86.010])    Surgeons: Kassandra Graves MD Provider: Rahul Luevano MD    Anesthesia Type: Not recorded ASA Status: 3          Anesthesia Type: No value filed.    Vitals  Vitals Value Taken Time   /81 06/08/22 0820   Temp     Pulse 87 06/08/22 0820   Resp 16 06/08/22 0820   SpO2 96 % 06/08/22 0820           Post Anesthesia Care and Evaluation    Level of consciousness: awake and alert  Pain management: adequate  Anesthetic complications: No anesthetic complications    Cardiovascular status: acceptable  Respiratory status: acceptable  Hydration status: acceptable    Comments: /81 (BP Location: Left arm, Patient Position: Lying)   Pulse 87   Resp 16   Ht 185.4 cm (73\")   Wt 100 kg (221 lb)   SpO2 96%   BMI 29.16 kg/m²         "

## 2022-06-08 NOTE — H&P
Kang Mantilal is a 66 y.o. male  who is referred by Kassandra Graves MD for a colonoscopy. He   has an indications: previous adenomatous polyp.     He denies any change in bowel function, melena, or hematochezia.    Past Medical History:   Diagnosis Date   • Chronic kidney disease, stage 3a (HCC)    • Colon polyps 02/13/2018    LEFT COLON POLYPS AND CECAL POLYPS - TUBULAR ADENOMAS - DR. DILIP TAYLOR/[facility]   • Essential hypertension    • Type 2 diabetes mellitus with hyperglycemia (HCC)        Past Surgical History:   Procedure Laterality Date   • COLONOSCOPY     • JOINT REPLACEMENT  01/2016    Right knee       Medications Prior to Admission   Medication Sig Dispense Refill Last Dose   • empagliflozin (Jardiance) 25 MG tablet tablet Take 1 tablet by mouth Daily. 90 tablet 0 6/7/2022 at Unknown time   • glimepiride (AMARYL) 2 MG tablet Take 1 tablet by mouth Daily. 90 tablet 1 6/7/2022 at Unknown time   • metFORMIN ER (GLUCOPHAGE-XR) 500 MG 24 hr tablet Take 2 tablets by mouth Daily With Breakfast. 180 tablet 1 6/7/2022 at Unknown time   • metoprolol succinate XL (TOPROL-XL) 25 MG 24 hr tablet Take 1 tablet by mouth Daily. 90 tablet 1 6/7/2022 at Unknown time       Allergies   Allergen Reactions   • Atorvastatin Other (See Comments)     weakness   • Lisinopril Cough       Family History   Problem Relation Age of Onset   • Malig Hyperthermia Neg Hx        Social History     Socioeconomic History   • Marital status:    Tobacco Use   • Smoking status: Never Smoker   • Smokeless tobacco: Never Used   Vaping Use   • Vaping Use: Never used   Substance and Sexual Activity   • Alcohol use: Yes     Alcohol/week: 12.0 standard drinks     Types: 12 Cans of beer per week     Comment: WEEKLY   • Drug use: Never   • Sexual activity: Yes     Partners: Female       Review of Systems   Gastrointestinal: Negative for abdominal pain, nausea and vomiting.   All other systems reviewed and are negative.      Vitals:     06/08/22 0647   BP: 136/82   Pulse: 95   Resp: 17   SpO2: 96%         Physical Exam  Constitutional:       Appearance: He is well-developed.   HENT:      Head: Normocephalic and atraumatic.   Eyes:      Pupils: Pupils are equal, round, and reactive to light.   Cardiovascular:      Rate and Rhythm: Regular rhythm.   Pulmonary:      Effort: Pulmonary effort is normal.   Abdominal:      General: There is no distension.      Palpations: Abdomen is soft.   Musculoskeletal:         General: Normal range of motion.   Skin:     General: Skin is warm and dry.   Neurological:      Mental Status: He is alert and oriented to person, place, and time.   Psychiatric:         Thought Content: Thought content normal.         Judgment: Judgment normal.           Assessment & Plan      indications: previous adenomatous polyp         I recommend colonoscopy.  I described risks, benefits of the procedure with the patient including but not limited to bleeding, infection, possibility of perforation and possible polypectomy. All of the patient's questions were answered and they would like to proceed with the above recommendations.

## 2022-06-08 NOTE — ANESTHESIA PREPROCEDURE EVALUATION
Anesthesia Evaluation     no history of anesthetic complications:  NPO Solid Status: > 8 hours  NPO Liquid Status: > 2 hours           Airway   Mallampati: I  Dental - normal exam   (+) implants    Pulmonary - normal exam   Cardiovascular     (+) hypertension, hyperlipidemia,       Neuro/Psych  GI/Hepatic/Renal/Endo    (+)   renal disease CRI, diabetes mellitus type 2,     Musculoskeletal     Abdominal    Substance History      OB/GYN          Other                        Anesthesia Plan    ASA 3       Anesthetic plan, risks, benefits, and alternatives have been provided, discussed and informed consent has been obtained with: patient.    Plan discussed with CRNA.        CODE STATUS:

## 2022-06-09 LAB
LAB AP CASE REPORT: NORMAL
LAB AP CLINICAL INFORMATION: NORMAL
PATH REPORT.FINAL DX SPEC: NORMAL
PATH REPORT.GROSS SPEC: NORMAL

## 2022-06-13 ENCOUNTER — OFFICE VISIT (OUTPATIENT)
Dept: FAMILY MEDICINE CLINIC | Age: 67
End: 2022-06-13

## 2022-06-13 VITALS
TEMPERATURE: 97.7 F | HEIGHT: 73 IN | DIASTOLIC BLOOD PRESSURE: 88 MMHG | SYSTOLIC BLOOD PRESSURE: 136 MMHG | BODY MASS INDEX: 30 KG/M2 | WEIGHT: 226.4 LBS | HEART RATE: 88 BPM

## 2022-06-13 DIAGNOSIS — I10 ESSENTIAL HYPERTENSION: ICD-10-CM

## 2022-06-13 DIAGNOSIS — N18.31 CHRONIC KIDNEY DISEASE, STAGE 3A: ICD-10-CM

## 2022-06-13 DIAGNOSIS — E11.65 TYPE 2 DIABETES MELLITUS WITH HYPERGLYCEMIA, WITHOUT LONG-TERM CURRENT USE OF INSULIN: ICD-10-CM

## 2022-06-13 PROCEDURE — 99214 OFFICE O/P EST MOD 30 MIN: CPT | Performed by: FAMILY MEDICINE

## 2022-06-13 RX ORDER — GLIMEPIRIDE 2 MG/1
2 TABLET ORAL DAILY
Qty: 90 TABLET | Refills: 1 | Status: SHIPPED | OUTPATIENT
Start: 2022-06-13 | End: 2023-01-26 | Stop reason: SDUPTHER

## 2022-06-13 RX ORDER — METOPROLOL SUCCINATE 25 MG/1
25 TABLET, EXTENDED RELEASE ORAL DAILY
Qty: 90 TABLET | Refills: 1 | Status: SHIPPED | OUTPATIENT
Start: 2022-06-13 | End: 2023-01-26 | Stop reason: SDUPTHER

## 2022-06-13 RX ORDER — METFORMIN HYDROCHLORIDE 500 MG/1
1000 TABLET, EXTENDED RELEASE ORAL
Qty: 180 TABLET | Refills: 1 | Status: SHIPPED | OUTPATIENT
Start: 2022-06-13 | End: 2023-01-26 | Stop reason: SDUPTHER

## 2022-06-13 NOTE — PROGRESS NOTES
Kang Mantilla presents to [facility].    Chief Complaint:  Diabetes follow up    Subjective       History of Present Illness:  Kang is in today for follow-up on diabetes.  He has type 2 diabetes for which he currently takes metformin, glimepiride, and Jardiance.  His most recent A1c from about a week ago was 7.3%.  He does check his sugar fairly regularly and states it will run between 130 and 140 in the mornings.  He denies any significant low blood sugars.    He also has hypertension and remains on a low dose of metoprolol succinate.  His pressure is fairly well controlled at this time.  He says that the pressure usually runs in a good range.  He also has some mild renal insufficiency.    Diabetes  He has type 2 diabetes mellitus. No MedicAlert identification noted. The initial diagnosis of diabetes was made 5 years ago. Pertinent negatives for hypoglycemia include no confusion, dizziness, headaches, hunger, mood changes, nervousness/anxiousness, pallor, seizures, sleepiness, speech difficulty, sweats or tremors. Pertinent negatives for diabetes include no blurred vision, no chest pain, no fatigue, no foot paresthesias, no foot ulcerations, no polydipsia, no polyuria, no visual change and no weakness. Pertinent negatives for hypoglycemia complications include no blackouts, no hospitalization, no nocturnal hypoglycemia, no required assistance and no required glucagon injection. Symptoms are improving. Pertinent negatives for diabetic complications include no CVA, heart disease, impotence, nephropathy, peripheral neuropathy, PVD or retinopathy. There are no known risk factors for coronary artery disease. Current diabetic treatment includes oral agent (triple therapy). He is compliant with treatment all of the time. His weight is stable. He is following a generally healthy diet. When asked about meal planning, he reported none. He has not had a previous visit with a dietitian. He  monitors urine at home <1 x per month. Blood glucose monitoring compliance is good. His breakfast blood glucose range is generally 130-140 mg/dl. His highest blood glucose is 140-180 mg/dl. His overall blood glucose range is 130-140 mg/dl. He does not see a podiatrist.Eye exam is current.       Review of Systems:  Review of Systems   Constitutional: Negative for chills, fatigue and fever.   Eyes: Negative for blurred vision.   Respiratory: Negative for cough and shortness of breath.    Cardiovascular: Negative for chest pain and palpitations.   Gastrointestinal: Negative for abdominal pain, nausea and vomiting.   Endocrine: Negative for polydipsia and polyuria.   Genitourinary: Negative for impotence.   Skin: Negative for pallor.   Neurological: Negative for dizziness, tremors, seizures, speech difficulty, weakness and confusion.   Psychiatric/Behavioral: The patient is not nervous/anxious.         Objective   Medical History:  Past Medical History:   • Chronic kidney disease, stage 3a (HCC)   • Colon polyps    LEFT COLON POLYPS AND CECAL POLYPS - TUBULAR ADENOMAS - DR. DILIP TAYLOR/[facility]   • Essential hypertension   • Type 2 diabetes mellitus with hyperglycemia (HCC)     Past Surgical History:   • COLONOSCOPY   • JOINT REPLACEMENT    Right knee      Family History   Problem Relation Age of Onset   • Malig Hyperthermia Neg Hx      Social History     Tobacco Use   • Smoking status: Never Smoker   • Smokeless tobacco: Never Used   Substance Use Topics   • Alcohol use: Yes     Alcohol/week: 12.0 standard drinks     Types: 12 Cans of beer per week     Comment: WEEKLY       Health Maintenance Due   Topic Date Due   • TDAP/TD VACCINES (1 - Tdap) Never done   • ZOSTER VACCINE (1 of 2) Never done   • ANNUAL WELLNESS VISIT  09/24/2021   • COVID-19 Vaccine (4 - Booster for Moderna series) 04/01/2022        Immunization History   Administered Date(s) Administered   • COVID-19 (MODERNA) 1st, 2nd, 3rd Dose Only  "03/04/2021, 04/02/2021, 12/01/2021   • Flu Vaccine Intradermal Quad 18-64YR 10/14/2021   • Flucelvax Quad Vial =>4yrs 10/01/2021   • Influenza TIV (IM) 11/03/2019   • Influenza, Unspecified 10/14/2021       Allergies   Allergen Reactions   • Atorvastatin Other (See Comments)     weakness   • Lisinopril Cough        Medications:  Current Outpatient Medications on File Prior to Visit   Medication Sig   • [DISCONTINUED] empagliflozin (Jardiance) 25 MG tablet tablet Take 1 tablet by mouth Daily.   • [DISCONTINUED] glimepiride (AMARYL) 2 MG tablet Take 1 tablet by mouth Daily.   • [DISCONTINUED] metFORMIN ER (GLUCOPHAGE-XR) 500 MG 24 hr tablet Take 2 tablets by mouth Daily With Breakfast.   • [DISCONTINUED] metoprolol succinate XL (TOPROL-XL) 25 MG 24 hr tablet Take 1 tablet by mouth Daily.     No current facility-administered medications on file prior to visit.       Vital Signs:   /88 (BP Location: Right arm, Patient Position: Sitting)   Pulse 88   Temp 97.7 °F (36.5 °C) (Oral)   Ht 185.4 cm (72.99\")   Wt 103 kg (226 lb 6.4 oz)   BMI 29.88 kg/m²       Physical Exam:  Physical Exam  Vitals reviewed.   Constitutional:       General: He is not in acute distress.     Appearance: He is not ill-appearing.   Eyes:      Pupils: Pupils are equal, round, and reactive to light.   Neck:      Comments: No thyromegaly  Cardiovascular:      Rate and Rhythm: Normal rate and regular rhythm.   Pulmonary:      Effort: Pulmonary effort is normal.      Breath sounds: Normal breath sounds.   Abdominal:      General: There is no distension.      Palpations: Abdomen is soft.      Tenderness: There is no abdominal tenderness.   Musculoskeletal:      Cervical back: Neck supple.   Lymphadenopathy:      Cervical: No cervical adenopathy.   Skin:     Findings: No lesion or rash.   Neurological:      Mental Status: He is alert.         Result Review      The following data was reviewed by Maverick Robins MD on 06/13/2022.  Lab " Results   Component Value Date    WBC 6.80 11/23/2021    HGB 17.2 11/23/2021    HCT 50.8 11/23/2021    MCV 91.5 11/23/2021     11/23/2021     Lab Results   Component Value Date    GLUCOSE 129 (H) 06/06/2022    BUN 13 06/06/2022    CREATININE 1.14 06/06/2022     06/06/2022    K 4.2 06/06/2022    CL 98 06/06/2022    CO2 24.0 06/06/2022    CALCIUM 9.4 06/06/2022    PROTEINTOT 7.3 06/06/2022    ALBUMIN 4.50 06/06/2022    ALT 26 06/06/2022    AST 22 06/06/2022    ALKPHOS 72 06/06/2022    BILITOT 0.5 06/06/2022    EGFRIFNONA 67 11/23/2021    GLOB 2.8 06/06/2022    AGRATIO 1.6 06/06/2022    BCR 11.4 06/06/2022    ANIONGAP 15.0 06/06/2022      Lab Results   Component Value Date    CHOL 201 (H) 06/06/2022    CHLPL 192 04/06/2021    TRIG 186 (H) 06/06/2022    HDL 49 06/06/2022     (H) 06/06/2022     Lab Results   Component Value Date    TSH 2.850 06/06/2022     Lab Results   Component Value Date    HGBA1C 7.30 (H) 06/06/2022     Lab Results   Component Value Date    PSA 0.683 11/23/2021    PSA 0.43 11/19/2020            Assessment and Plan:   Today, we have reviewed his care.  Kang is doing well overall.  He remains in good health.  With regard to his diabetes, we will continue his current medications.  We would like to see his A1c running a little better range, and I have encouraged him to increase his activity week to week if possible.  With regard to his blood pressure, metoprolol will be continued as is.  He is not able to take statins or ACE related therapy.       Diagnoses and all orders for this visit:    1. Type 2 diabetes mellitus with hyperglycemia, without long-term current use of insulin (HCC)  -     empagliflozin (Jardiance) 25 MG tablet tablet; Take 1 tablet by mouth Daily.  Dispense: 90 tablet; Refill: 1  -     glimepiride (AMARYL) 2 MG tablet; Take 1 tablet by mouth Daily.  Dispense: 90 tablet; Refill: 1  -     metFORMIN ER (GLUCOPHAGE-XR) 500 MG 24 hr tablet; Take 2 tablets by mouth Daily  With Breakfast.  Dispense: 180 tablet; Refill: 1    2. Essential hypertension  -     metoprolol succinate XL (TOPROL-XL) 25 MG 24 hr tablet; Take 1 tablet by mouth Daily.  Dispense: 90 tablet; Refill: 1    3. Chronic kidney disease, stage 3a (HCC)  -     metoprolol succinate XL (TOPROL-XL) 25 MG 24 hr tablet; Take 1 tablet by mouth Daily.  Dispense: 90 tablet; Refill: 1          Follow Up   Return in about 6 months (around 12/13/2022) for Recheck, Medicare Wellness.  Patient was given instructions and counseling regarding his condition or for health maintenance advice. Please see specific information pulled into the AVS if appropriate.

## 2022-09-13 ENCOUNTER — TELEPHONE (OUTPATIENT)
Dept: FAMILY MEDICINE CLINIC | Age: 67
End: 2022-09-13

## 2022-09-13 NOTE — TELEPHONE ENCOUNTER
----- Message from Niecy Pryor LPN sent at 6/13/2022 11:10 AM EDT -----   TICKLE for fingerstick A1c here in 90 days

## 2022-09-15 ENCOUNTER — CLINICAL SUPPORT (OUTPATIENT)
Dept: FAMILY MEDICINE CLINIC | Age: 67
End: 2022-09-15

## 2022-09-15 DIAGNOSIS — E11.65 TYPE 2 DIABETES MELLITUS WITH HYPERGLYCEMIA, WITHOUT LONG-TERM CURRENT USE OF INSULIN: ICD-10-CM

## 2022-09-15 DIAGNOSIS — I10 ESSENTIAL HYPERTENSION: Primary | ICD-10-CM

## 2022-09-15 DIAGNOSIS — E78.2 MIXED HYPERLIPIDEMIA: ICD-10-CM

## 2022-09-15 LAB
EXPIRATION DATE: NORMAL
HBA1C MFR BLD: 7 %
Lab: NORMAL

## 2022-09-15 PROCEDURE — 83036 HEMOGLOBIN GLYCOSYLATED A1C: CPT | Performed by: FAMILY MEDICINE

## 2022-09-15 PROCEDURE — 3051F HG A1C>EQUAL 7.0%<8.0%: CPT | Performed by: FAMILY MEDICINE

## 2022-12-28 ENCOUNTER — TELEPHONE (OUTPATIENT)
Dept: FAMILY MEDICINE CLINIC | Age: 67
End: 2022-12-28

## 2022-12-28 DIAGNOSIS — Z12.5 SCREENING FOR PROSTATE CANCER: ICD-10-CM

## 2022-12-28 DIAGNOSIS — E11.65 TYPE 2 DIABETES MELLITUS WITH HYPERGLYCEMIA, WITHOUT LONG-TERM CURRENT USE OF INSULIN: Primary | ICD-10-CM

## 2022-12-28 DIAGNOSIS — I10 ESSENTIAL HYPERTENSION: ICD-10-CM

## 2022-12-28 DIAGNOSIS — N18.31 CHRONIC KIDNEY DISEASE, STAGE 3A: ICD-10-CM

## 2022-12-28 DIAGNOSIS — E78.2 MIXED HYPERLIPIDEMIA: ICD-10-CM

## 2022-12-28 NOTE — TELEPHONE ENCOUNTER
Caller: DOLORES CLAY    Relationship: Emergency Contact    Best call back number: [phone]    What orders are you requesting (i.e. lab or imaging): LAB ORDERS FOR UPCOMING APPOINTMENT ON 01/26    In what timeframe would the patient need to come in: BEFORE APPOINTMENT    Where will you receive your lab/imaging services: [facility]     Additional notes: PLEASE CALL WHEN ORDERS HAVE BEEN PUT IN

## 2023-01-02 NOTE — TELEPHONE ENCOUNTER
Please let them know that I have placed orders for labs for Kang to have done prior to his appointment.  It would be ideal for him to have the testing done about a week before he sees me.  He should be fasting when he has these labs done.  Thanks.

## 2023-01-16 ENCOUNTER — LAB (OUTPATIENT)
Dept: LAB | Facility: HOSPITAL | Age: 68
End: 2023-01-16
Payer: MEDICARE

## 2023-01-16 DIAGNOSIS — Z12.5 SCREENING FOR PROSTATE CANCER: ICD-10-CM

## 2023-01-16 DIAGNOSIS — I10 ESSENTIAL HYPERTENSION: ICD-10-CM

## 2023-01-16 DIAGNOSIS — E11.65 TYPE 2 DIABETES MELLITUS WITH HYPERGLYCEMIA, WITHOUT LONG-TERM CURRENT USE OF INSULIN: ICD-10-CM

## 2023-01-16 DIAGNOSIS — N18.31 CHRONIC KIDNEY DISEASE, STAGE 3A: ICD-10-CM

## 2023-01-16 DIAGNOSIS — E78.2 MIXED HYPERLIPIDEMIA: ICD-10-CM

## 2023-01-16 LAB
ALBUMIN SERPL-MCNC: 4.5 G/DL (ref 3.5–5.2)
ALBUMIN UR-MCNC: 2.6 MG/DL
ALBUMIN/GLOB SERPL: 1.4 G/DL
ALP SERPL-CCNC: 86 U/L (ref 39–117)
ALT SERPL W P-5'-P-CCNC: 32 U/L (ref 1–41)
ANION GAP SERPL CALCULATED.3IONS-SCNC: 8 MMOL/L (ref 5–15)
AST SERPL-CCNC: 22 U/L (ref 1–40)
BACTERIA UR QL AUTO: NORMAL /HPF
BILIRUB SERPL-MCNC: 0.5 MG/DL (ref 0–1.2)
BILIRUB UR QL STRIP: NEGATIVE
BUN SERPL-MCNC: 20 MG/DL (ref 8–23)
BUN/CREAT SERPL: 17.5 (ref 7–25)
CALCIUM SPEC-SCNC: 9.4 MG/DL (ref 8.6–10.5)
CHLORIDE SERPL-SCNC: 100 MMOL/L (ref 98–107)
CHOLEST SERPL-MCNC: 197 MG/DL (ref 0–200)
CLARITY UR: CLEAR
CO2 SERPL-SCNC: 30 MMOL/L (ref 22–29)
COLOR UR: YELLOW
CREAT SERPL-MCNC: 1.14 MG/DL (ref 0.76–1.27)
DEPRECATED RDW RBC AUTO: 41 FL (ref 37–54)
EGFRCR SERPLBLD CKD-EPI 2021: 70.5 ML/MIN/1.73
ERYTHROCYTE [DISTWIDTH] IN BLOOD BY AUTOMATED COUNT: 11.9 % (ref 12.3–15.4)
GLOBULIN UR ELPH-MCNC: 3.2 GM/DL
GLUCOSE SERPL-MCNC: 150 MG/DL (ref 65–99)
GLUCOSE UR STRIP-MCNC: ABNORMAL MG/DL
HBA1C MFR BLD: 7.5 % (ref 4.8–5.6)
HCT VFR BLD AUTO: 51.6 % (ref 37.5–51)
HDLC SERPL-MCNC: 47 MG/DL (ref 40–60)
HGB BLD-MCNC: 17.1 G/DL (ref 13–17.7)
HGB UR QL STRIP.AUTO: NEGATIVE
KETONES UR QL STRIP: NEGATIVE
LDLC SERPL CALC-MCNC: 133 MG/DL (ref 0–100)
LDLC/HDLC SERPL: 2.8 {RATIO}
LEUKOCYTE ESTERASE UR QL STRIP.AUTO: NEGATIVE
MCH RBC QN AUTO: 30.6 PG (ref 26.6–33)
MCHC RBC AUTO-ENTMCNC: 33.1 G/DL (ref 31.5–35.7)
MCV RBC AUTO: 92.5 FL (ref 79–97)
NITRITE UR QL STRIP: NEGATIVE
PH UR STRIP.AUTO: 5.5 [PH] (ref 5–8)
PLATELET # BLD AUTO: 314 10*3/MM3 (ref 140–450)
PMV BLD AUTO: 10.1 FL (ref 6–12)
POTASSIUM SERPL-SCNC: 4.3 MMOL/L (ref 3.5–5.2)
PROT SERPL-MCNC: 7.7 G/DL (ref 6–8.5)
PROT UR QL STRIP: NEGATIVE
PSA SERPL-MCNC: 0.58 NG/ML (ref 0–4)
RBC # BLD AUTO: 5.58 10*6/MM3 (ref 4.14–5.8)
RBC # UR STRIP: NORMAL /HPF
REF LAB TEST METHOD: NORMAL
SODIUM SERPL-SCNC: 138 MMOL/L (ref 136–145)
SP GR UR STRIP: 1.02 (ref 1–1.03)
SQUAMOUS #/AREA URNS HPF: NORMAL /HPF
TRIGL SERPL-MCNC: 91 MG/DL (ref 0–150)
UROBILINOGEN UR QL STRIP: ABNORMAL
VLDLC SERPL-MCNC: 17 MG/DL (ref 5–40)
WBC # UR STRIP: NORMAL /HPF
WBC NRBC COR # BLD: 9.41 10*3/MM3 (ref 3.4–10.8)

## 2023-01-16 PROCEDURE — 82043 UR ALBUMIN QUANTITATIVE: CPT

## 2023-01-16 PROCEDURE — 80053 COMPREHEN METABOLIC PANEL: CPT

## 2023-01-16 PROCEDURE — 80061 LIPID PANEL: CPT

## 2023-01-16 PROCEDURE — 81001 URINALYSIS AUTO W/SCOPE: CPT

## 2023-01-16 PROCEDURE — G0103 PSA SCREENING: HCPCS

## 2023-01-16 PROCEDURE — 36415 COLL VENOUS BLD VENIPUNCTURE: CPT

## 2023-01-16 PROCEDURE — 85027 COMPLETE CBC AUTOMATED: CPT

## 2023-01-16 PROCEDURE — 83036 HEMOGLOBIN GLYCOSYLATED A1C: CPT

## 2023-01-18 ENCOUNTER — TELEMEDICINE (OUTPATIENT)
Dept: FAMILY MEDICINE CLINIC | Age: 68
End: 2023-01-18
Payer: MEDICARE

## 2023-01-18 VITALS — BODY MASS INDEX: 29.03 KG/M2 | WEIGHT: 220 LBS

## 2023-01-18 DIAGNOSIS — U07.1 COVID-19: Primary | ICD-10-CM

## 2023-01-18 PROCEDURE — 3051F HG A1C>EQUAL 7.0%<8.0%: CPT

## 2023-01-18 PROCEDURE — 99213 OFFICE O/P EST LOW 20 MIN: CPT

## 2023-01-18 RX ORDER — ASPIRIN 81 MG/1
81 TABLET ORAL
COMMUNITY
End: 2023-01-26

## 2023-01-18 NOTE — PROGRESS NOTES
Subjective     CHIEF COMPLAINT    Chief Complaint   Patient presents with   • Covid-19 Home Monitoring Video Visit     Tested positive 01/18/23, fever, cough, headaches x2-3 days.   [id number]       History of Present Illness  Kang Mantilla has consented to use a telehealth visit for his medical care today: Yes.  Additional staff present for the encounter was Bang Mon MA.  This telehealth visit was conducted today via video conference.  I am present in the office and conducting the visit via [employer] on my phone.  Kang is present at home and conducting his end of the visit using his smart phone. ID was confirmed with name and date of birth.     Patient is a 67 year old male presenting via telehealth due to a positive home COVID test. Test was positive today. Symptoms began on Sunday. Symptoms include cough, mild intermittent fever with tmax of 99.5. He also has a runny nose, headaches and mild body aches. Denies any shortness of breath, wheezing or chest pain. He is not fully vaccinated for COVID.       Review of Systems   Constitutional: Positive for chills and fever.   HENT: Positive for rhinorrhea.    Respiratory: Positive for cough. Negative for shortness of breath and wheezing.    Cardiovascular: Negative for chest pain.   Gastrointestinal: Negative for nausea and vomiting.   Musculoskeletal: Positive for myalgias.   Neurological: Positive for headaches.       Allergies   Allergen Reactions   • Atorvastatin Other (See Comments)     weakness   • Lisinopril Cough     Current Outpatient Medications on File Prior to Visit   Medication Sig Dispense Refill   • aspirin 81 MG EC tablet Take 81 mg by mouth.     • Blood Glucose Monitoring Suppl (True Metrix Meter) w/Device kit 1 kit Daily. Check blood sugar daily  DX E11.9 1 kit 0   • empagliflozin (Jardiance) 25 MG tablet tablet Take 1 tablet by mouth Daily. 90 tablet 1   • glimepiride (AMARYL) 2 MG tablet Take 1 tablet by mouth Daily. 90 tablet 1   • glucose  blood (True Metrix Blood Glucose Test) test strip Check blood sugar daily  DX E11.9 100 each 3   • Lancets 30G misc Check blood sugar daily  DX E11.9 100 each 3   • metFORMIN ER (GLUCOPHAGE-XR) 500 MG 24 hr tablet Take 2 tablets by mouth Daily With Breakfast. 180 tablet 1   • metoprolol succinate XL (TOPROL-XL) 25 MG 24 hr tablet Take 1 tablet by mouth Daily. 90 tablet 1     No current facility-administered medications on file prior to visit.     Wt 99.8 kg (220 lb)   BMI 29.03 kg/m²     Objective     Physical Exam  Vitals reviewed.   Constitutional:       General: He is not in acute distress.     Appearance: Normal appearance. He is not ill-appearing.      Comments: Speaking in complete sentences without difficulty    HENT:      Head: Normocephalic.   Pulmonary:      Effort: No respiratory distress.   Neurological:      Mental Status: He is alert and oriented to person, place, and time.   Psychiatric:         Mood and Affect: Mood normal.         Speech: Speech normal.         Behavior: Behavior normal.             Diagnoses and all orders for this visit:    1. COVID-19 (Primary)  -     Nirmatrelvir&Ritonavir 300/100 (PAXLOVID) 20 x 150 MG & 10 x 100MG tablet therapy pack tablet; Take 3 tablets by mouth 2 (Two) Times a Day for 5 days.  Dispense: 30 tablet; Refill: 0    Patient is a candidate for antiviral therapy for COVID given his age, history of diabetes and that he is not fully vaccinated for COVID. He is on day 4 of symptoms. We discussed these medications including risks, benefits and side effects and patient is agreeable to medications. He qualifies for Paxlovid. His GFR from 1/16/23 was 70.5. His current medications were reviewed with pharmacy for interactions with Paxlovid and theres are no interactions noted. Paxlovid sent to pharmacy. Isolate per CDC guidelines. Symptomatic treatment discussed. Increase fluids.     Patient educated on limitations of telehealth visit and was advised to come to clinic for  assessment if symptoms worsen or do not improve. For any shortness of breath, chest pain or leg swelling, patient was instructed to proceed to the ER. Patient voiced understanding of all instructions and had no further questions at this time.       Follow up:   Return if symptoms worsen or fail to improve.  Patient was given instructions and counseling regarding his condition or for health maintenance advice. Please see specific information pulled into the AVS if appropriate.

## 2023-01-26 ENCOUNTER — OFFICE VISIT (OUTPATIENT)
Dept: FAMILY MEDICINE CLINIC | Age: 68
End: 2023-01-26
Payer: MEDICARE

## 2023-01-26 VITALS
TEMPERATURE: 97.8 F | HEIGHT: 73 IN | SYSTOLIC BLOOD PRESSURE: 128 MMHG | HEART RATE: 76 BPM | BODY MASS INDEX: 29.1 KG/M2 | WEIGHT: 219.6 LBS | DIASTOLIC BLOOD PRESSURE: 78 MMHG

## 2023-01-26 DIAGNOSIS — R05.2 SUBACUTE COUGH: ICD-10-CM

## 2023-01-26 DIAGNOSIS — E78.2 MIXED HYPERLIPIDEMIA: ICD-10-CM

## 2023-01-26 DIAGNOSIS — E11.65 TYPE 2 DIABETES MELLITUS WITH HYPERGLYCEMIA, WITHOUT LONG-TERM CURRENT USE OF INSULIN: Primary | ICD-10-CM

## 2023-01-26 DIAGNOSIS — I10 ESSENTIAL HYPERTENSION: ICD-10-CM

## 2023-01-26 DIAGNOSIS — N18.31 CHRONIC KIDNEY DISEASE, STAGE 3A: ICD-10-CM

## 2023-01-26 PROCEDURE — 3051F HG A1C>EQUAL 7.0%<8.0%: CPT | Performed by: FAMILY MEDICINE

## 2023-01-26 PROCEDURE — 99214 OFFICE O/P EST MOD 30 MIN: CPT | Performed by: FAMILY MEDICINE

## 2023-01-26 RX ORDER — METOPROLOL SUCCINATE 25 MG/1
25 TABLET, EXTENDED RELEASE ORAL DAILY
Qty: 90 TABLET | Refills: 3 | Status: SHIPPED | OUTPATIENT
Start: 2023-01-26

## 2023-01-26 RX ORDER — METFORMIN HYDROCHLORIDE 500 MG/1
1000 TABLET, EXTENDED RELEASE ORAL
Qty: 180 TABLET | Refills: 3 | Status: SHIPPED | OUTPATIENT
Start: 2023-01-26

## 2023-01-26 RX ORDER — GLIMEPIRIDE 2 MG/1
2 TABLET ORAL DAILY
Qty: 90 TABLET | Refills: 3 | Status: SHIPPED | OUTPATIENT
Start: 2023-01-26

## 2023-01-26 NOTE — PROGRESS NOTES
Kang Mantilla presents to [facility].    Chief Complaint:  Diabetes, blood pressure    Subjective       History of Present Illness:  Kang is in today for follow-up on his care.  He has type 2 diabetes for which he takes Jardiance, glimepiride, and metformin.  He states his blood sugars will run in the 130s- 170s in the mornings.  He denies significant hypoglycemia.  He denies obvious side effects from the medications.  His most recent A1c was 7.5%.    He also has hypertension and remains on metoprolol for this.  His blood pressure runs in a good range at home.  He is not able to take ACE inhibitor's due to side effects.    He also was noted to have a moderate elevation of his cholesterol.  He is not wanting to take cholesterol medication.  He had significant difficulty in the past with atorvastatin    Review of Systems:  Review of Systems   Constitutional: Negative for chills and fever.   Respiratory: Positive for cough. Negative for shortness of breath.    Cardiovascular: Negative for chest pain and palpitations.   Gastrointestinal: Negative for abdominal pain, nausea and vomiting.        Objective   Medical History:  Past Medical History:   • Chronic kidney disease, stage 3a (HCC)   • Colon polyps    FOLLOWED BY DR.NECHOL ROWAN   • Essential hypertension   • Type 2 diabetes mellitus with hyperglycemia (HCC)     Past Surgical History:   • COLONOSCOPY    5 MM TUBULAR ADENOMA POLYP IN TRANSVERSE, 6 MM TUBULAR ADENOMA POLYP IN DESCENDING, 2 TUBULAR ADENOMA POLYPS IN DISTAL DESCENDING, 5 MM  TUBULAR ADENOMA POLYP IN SIGMOID, 1 TUBULAR ADENOMA POLYP IN RECTUM, 1 BENIGN POLYP IN RECTUM, MULTIPLE DIVERTICULA IN SIGMOID, RESCOPE IN 3 YRS, DR. KASSANDRA ROWAN AT [facility]   • COLONOSCOPY    Procedure: COLONOSCOPY to cecum with hot snare polypectomies;  Surgeon: Kassandra Rowan MD;  Location: [facility] ENDOSCOPY;  Service: General;  Laterality: N/A;  pre-hx polyps  post-polyps, diverticulosis   • JOINT  REPLACEMENT    Right knee      Family History   Problem Relation Age of Onset   • Malig Hyperthermia Neg Hx      Social History     Tobacco Use   • Smoking status: Never   • Smokeless tobacco: Never   Substance Use Topics   • Alcohol use: Yes     Alcohol/week: 12.0 standard drinks     Types: 12 Cans of beer per week     Comment: WEEKLY       Health Maintenance Due   Topic Date Due   • TDAP/TD VACCINES (1 - Tdap) Never done   • ZOSTER VACCINE (1 of 2) Never done   • ANNUAL WELLNESS VISIT  09/24/2021   • DIABETIC EYE EXAM  08/01/2022        Immunization History   Administered Date(s) Administered   • COVID-19 (MODERNA) 1st, 2nd, 3rd Dose Only 03/04/2021, 04/02/2021, 12/01/2021   • Flu Vaccine Intradermal Quad 18-64YR 10/14/2021   • Flucelvax Quad Vial =>4yrs 10/01/2021   • Fluzone High-Dose 65+yrs 10/14/2022   • Influenza Quad Vaccine (Inpatient) 11/03/2019   • Influenza TIV (IM) 11/03/2019   • Influenza, Unspecified 10/14/2021   • Pneumococcal Conjugate 20-Valent (PCV20) 08/26/2022   • flucelvax quad pfs =>4 YRS 10/14/2021       Allergies   Allergen Reactions   • Atorvastatin Other (See Comments)     weakness   • Lisinopril Cough        Medications:  Current Outpatient Medications on File Prior to Visit   Medication Sig   • Blood Glucose Monitoring Suppl (True Metrix Meter) w/Device kit 1 kit Daily. Check blood sugar daily  DX E11.9   • glucose blood (True Metrix Blood Glucose Test) test strip Check blood sugar daily  DX E11.9   • Lancets 30G misc Check blood sugar daily  DX E11.9   • [DISCONTINUED] empagliflozin (Jardiance) 25 MG tablet tablet Take 1 tablet by mouth Daily.   • [DISCONTINUED] glimepiride (AMARYL) 2 MG tablet Take 1 tablet by mouth Daily.   • [DISCONTINUED] metFORMIN ER (GLUCOPHAGE-XR) 500 MG 24 hr tablet Take 2 tablets by mouth Daily With Breakfast.   • [DISCONTINUED] metoprolol succinate XL (TOPROL-XL) 25 MG 24 hr tablet Take 1 tablet by mouth Daily.   • [DISCONTINUED] aspirin 81 MG EC tablet Take  "81 mg by mouth.     No current facility-administered medications on file prior to visit.       Vital Signs:   Vitals:    01/26/23 1026 01/26/23 1053   BP: 130/97 128/78   BP Location: Right arm Right arm   Patient Position: Sitting Sitting   Pulse: 77 76   Temp: 97.8 °F (36.6 °C)    TempSrc: Oral    Weight: 99.6 kg (219 lb 9.6 oz)    Height: 185.4 cm (72.99\")    Body mass index is 28.98 kg/m².    Physical Exam:  Physical Exam  Vitals and nursing note reviewed.   Constitutional:       General: He is not in acute distress.     Appearance: He is not ill-appearing.   HENT:      Right Ear: Tympanic membrane and ear canal normal.      Left Ear: Tympanic membrane and ear canal normal.      Mouth/Throat:      Mouth: Mucous membranes are moist.      Comments: Pharynx appears normal  Eyes:      Extraocular Movements: Extraocular movements intact.      Pupils: Pupils are equal, round, and reactive to light.   Neck:      Thyroid: No thyromegaly.   Cardiovascular:      Rate and Rhythm: Normal rate and regular rhythm.      Heart sounds: No murmur heard.  Pulmonary:      Effort: Pulmonary effort is normal.      Breath sounds: Normal breath sounds.   Abdominal:      General: There is no distension.      Palpations: Abdomen is soft. There is no mass.      Tenderness: There is no abdominal tenderness.   Musculoskeletal:      Cervical back: Normal range of motion.   Skin:     Findings: No lesion or rash.   Neurological:      General: No focal deficit present.      Mental Status: He is oriented to person, place, and time.      Cranial Nerves: No cranial nerve deficit.   Psychiatric:         Mood and Affect: Mood normal.         Result Review      The following data was reviewed by Maverick Robins MD on 01/26/2023.  Lab Results   Component Value Date    WBC 9.41 01/16/2023    HGB 17.1 01/16/2023    HCT 51.6 (H) 01/16/2023    MCV 92.5 01/16/2023     01/16/2023     Lab Results   Component Value Date    GLUCOSE 150 (H) " 01/16/2023    BUN 20 01/16/2023    CREATININE 1.14 01/16/2023     01/16/2023    K 4.3 01/16/2023     01/16/2023    CO2 30.0 (H) 01/16/2023    CALCIUM 9.4 01/16/2023    PROTEINTOT 7.7 01/16/2023    ALBUMIN 4.5 01/16/2023    ALT 32 01/16/2023    AST 22 01/16/2023    ALKPHOS 86 01/16/2023    BILITOT 0.5 01/16/2023    EGFRIFNONA 67 11/23/2021    GLOB 3.2 01/16/2023    AGRATIO 1.4 01/16/2023    BCR 17.5 01/16/2023    ANIONGAP 8.0 01/16/2023      Lab Results   Component Value Date    CHOL 197 01/16/2023    CHLPL 192 04/06/2021    TRIG 91 01/16/2023    HDL 47 01/16/2023     (H) 01/16/2023     Lab Results   Component Value Date    TSH 2.850 06/06/2022     Lab Results   Component Value Date    HGBA1C 7.50 (H) 01/16/2023     Lab Results   Component Value Date    PSA 0.579 01/16/2023    PSA 0.683 11/23/2021    PSA 0.43 11/19/2020            Assessment and Plan:   Today, we have reviewed his care.  His A1c was a little bit high on this last check, but he thinks it may have been contributed to by diet.  We will ask him to be diligent with regard to diet and activity over the next few months and we will reach out to him for a fingerstick A1c in 90 days.  His blood pressure is mildly elevated, and it will be rechecked before he leaves.  We discussed the recommendation for statin therapy, but he had significant difficulty tolerating atorvastatin and declines additional treatment at this time.  We will keep this on our radar.  Otherwise, we will plan to see Kang back in about 6 months.  No other short-term change is anticipated.       Diagnoses and all orders for this visit:    1. Type 2 diabetes mellitus with hyperglycemia, without long-term current use of insulin (HCC) (Primary)  -     empagliflozin (Jardiance) 25 MG tablet tablet; Take 1 tablet by mouth Daily.  Dispense: 90 tablet; Refill: 3  -     glimepiride (AMARYL) 2 MG tablet; Take 1 tablet by mouth Daily.  Dispense: 90 tablet; Refill: 3  -     metFORMIN  ER (GLUCOPHAGE-XR) 500 MG 24 hr tablet; Take 2 tablets by mouth Daily With Breakfast.  Dispense: 180 tablet; Refill: 3    2. Essential hypertension  -     metoprolol succinate XL (TOPROL-XL) 25 MG 24 hr tablet; Take 1 tablet by mouth Daily.  Dispense: 90 tablet; Refill: 3    3. Chronic kidney disease, stage 3a (HCC)  -     metoprolol succinate XL (TOPROL-XL) 25 MG 24 hr tablet; Take 1 tablet by mouth Daily.  Dispense: 90 tablet; Refill: 3    4. Mixed hyperlipidemia  Comments:  As above.    5. Subacute cough  Comments:  He will call back in the next few weeks if this does not continue to improve.          Follow Up   Return in about 6 months (around 7/26/2023) for Recheck, Medicare Wellness.  Patient was given instructions and counseling regarding his condition or for health maintenance advice. Please see specific information pulled into the AVS if appropriate.

## 2023-04-26 ENCOUNTER — TELEPHONE (OUTPATIENT)
Dept: FAMILY MEDICINE CLINIC | Age: 68
End: 2023-04-26
Payer: MEDICARE

## 2023-04-26 NOTE — TELEPHONE ENCOUNTER
----- Message from Niecy Pryor LPN sent at 1/26/2023 11:11 AM EST -----  TICKLE for fingerstick A1C 90 days.

## 2023-04-28 ENCOUNTER — CLINICAL SUPPORT (OUTPATIENT)
Dept: FAMILY MEDICINE CLINIC | Age: 68
End: 2023-04-28
Payer: MEDICARE

## 2023-04-28 DIAGNOSIS — E11.65 TYPE 2 DIABETES MELLITUS WITH HYPERGLYCEMIA, WITHOUT LONG-TERM CURRENT USE OF INSULIN: Primary | ICD-10-CM

## 2023-04-28 LAB
EXPIRATION DATE: NORMAL
HBA1C MFR BLD: 6.9 %
Lab: NORMAL

## 2023-08-03 ENCOUNTER — OFFICE VISIT (OUTPATIENT)
Dept: FAMILY MEDICINE CLINIC | Age: 68
End: 2023-08-03
Payer: MEDICARE

## 2023-08-03 VITALS
SYSTOLIC BLOOD PRESSURE: 143 MMHG | TEMPERATURE: 97.5 F | HEART RATE: 81 BPM | BODY MASS INDEX: 29.42 KG/M2 | HEIGHT: 73 IN | WEIGHT: 222 LBS | DIASTOLIC BLOOD PRESSURE: 84 MMHG

## 2023-08-03 DIAGNOSIS — E78.2 MIXED HYPERLIPIDEMIA: ICD-10-CM

## 2023-08-03 DIAGNOSIS — Z00.00 PHYSICAL EXAM: Primary | ICD-10-CM

## 2023-08-03 DIAGNOSIS — E11.65 TYPE 2 DIABETES MELLITUS WITH HYPERGLYCEMIA, WITHOUT LONG-TERM CURRENT USE OF INSULIN: ICD-10-CM

## 2023-08-03 DIAGNOSIS — I10 ESSENTIAL HYPERTENSION: ICD-10-CM

## 2023-08-04 ENCOUNTER — LAB (OUTPATIENT)
Dept: LAB | Facility: HOSPITAL | Age: 68
End: 2023-08-04
Payer: MEDICARE

## 2023-08-04 DIAGNOSIS — E11.65 TYPE 2 DIABETES MELLITUS WITH HYPERGLYCEMIA, WITHOUT LONG-TERM CURRENT USE OF INSULIN: ICD-10-CM

## 2023-08-04 DIAGNOSIS — E78.2 MIXED HYPERLIPIDEMIA: ICD-10-CM

## 2023-08-04 DIAGNOSIS — I10 ESSENTIAL HYPERTENSION: ICD-10-CM

## 2023-08-04 LAB
ALBUMIN SERPL-MCNC: 4.2 G/DL (ref 3.5–5.2)
ALBUMIN/GLOB SERPL: 1.4 G/DL
ALP SERPL-CCNC: 76 U/L (ref 39–117)
ALT SERPL W P-5'-P-CCNC: 30 U/L (ref 1–41)
ANION GAP SERPL CALCULATED.3IONS-SCNC: 14.1 MMOL/L (ref 5–15)
AST SERPL-CCNC: 21 U/L (ref 1–40)
BILIRUB SERPL-MCNC: 0.7 MG/DL (ref 0–1.2)
BUN SERPL-MCNC: 16 MG/DL (ref 8–23)
BUN/CREAT SERPL: 13.8 (ref 7–25)
CALCIUM SPEC-SCNC: 8.9 MG/DL (ref 8.6–10.5)
CHLORIDE SERPL-SCNC: 100 MMOL/L (ref 98–107)
CO2 SERPL-SCNC: 22.9 MMOL/L (ref 22–29)
CREAT SERPL-MCNC: 1.16 MG/DL (ref 0.76–1.27)
EGFRCR SERPLBLD CKD-EPI 2021: 69 ML/MIN/1.73
GLOBULIN UR ELPH-MCNC: 3 GM/DL
GLUCOSE SERPL-MCNC: 128 MG/DL (ref 65–99)
HBA1C MFR BLD: 6.9 % (ref 4.8–5.6)
POTASSIUM SERPL-SCNC: 3.8 MMOL/L (ref 3.5–5.2)
PROT SERPL-MCNC: 7.2 G/DL (ref 6–8.5)
SODIUM SERPL-SCNC: 137 MMOL/L (ref 136–145)
TSH SERPL DL<=0.05 MIU/L-ACNC: 2.57 UIU/ML (ref 0.27–4.2)

## 2023-08-04 PROCEDURE — 83036 HEMOGLOBIN GLYCOSYLATED A1C: CPT

## 2023-08-04 PROCEDURE — 36415 COLL VENOUS BLD VENIPUNCTURE: CPT

## 2023-08-04 PROCEDURE — 80053 COMPREHEN METABOLIC PANEL: CPT

## 2023-08-04 PROCEDURE — 84443 ASSAY THYROID STIM HORMONE: CPT

## 2023-09-29 RX ORDER — CALCIUM CITRATE/VITAMIN D3 200MG-6.25
TABLET ORAL
Qty: 100 EACH | Refills: 3 | Status: SHIPPED | OUTPATIENT
Start: 2023-09-29

## 2023-12-11 RX ORDER — GLUCOSAM/CHON-MSM1/C/MANG/BOSW 500-416.6
TABLET ORAL
Qty: 100 EACH | Refills: 3 | Status: SHIPPED | OUTPATIENT
Start: 2023-12-11

## 2023-12-30 DIAGNOSIS — N18.31 CHRONIC KIDNEY DISEASE, STAGE 3A: ICD-10-CM

## 2023-12-30 DIAGNOSIS — E11.65 TYPE 2 DIABETES MELLITUS WITH HYPERGLYCEMIA, WITHOUT LONG-TERM CURRENT USE OF INSULIN: ICD-10-CM

## 2023-12-30 DIAGNOSIS — I10 ESSENTIAL HYPERTENSION: ICD-10-CM

## 2024-01-02 RX ORDER — METOPROLOL SUCCINATE 25 MG/1
25 TABLET, EXTENDED RELEASE ORAL DAILY
Qty: 90 TABLET | Refills: 0 | Status: SHIPPED | OUTPATIENT
Start: 2024-01-02

## 2024-01-02 RX ORDER — GLIMEPIRIDE 2 MG/1
2 TABLET ORAL DAILY
Qty: 90 TABLET | Refills: 0 | Status: SHIPPED | OUTPATIENT
Start: 2024-01-02

## 2024-03-28 ENCOUNTER — TELEPHONE (OUTPATIENT)
Dept: FAMILY MEDICINE CLINIC | Age: 69
End: 2024-03-28
Payer: MEDICARE

## 2024-03-28 DIAGNOSIS — E78.2 MIXED HYPERLIPIDEMIA: ICD-10-CM

## 2024-03-28 DIAGNOSIS — E11.65 TYPE 2 DIABETES MELLITUS WITH HYPERGLYCEMIA, WITHOUT LONG-TERM CURRENT USE OF INSULIN: Primary | ICD-10-CM

## 2024-03-28 DIAGNOSIS — Z12.5 PROSTATE CANCER SCREENING: ICD-10-CM

## 2024-03-28 DIAGNOSIS — I10 ESSENTIAL HYPERTENSION: ICD-10-CM

## 2024-03-28 DIAGNOSIS — N18.31 CHRONIC KIDNEY DISEASE, STAGE 3A: ICD-10-CM

## 2024-03-28 DIAGNOSIS — Z79.899 OTHER LONG TERM (CURRENT) DRUG THERAPY: ICD-10-CM

## 2024-03-28 NOTE — TELEPHONE ENCOUNTER
Caller: DOLORES CLAY    Relationship: Emergency Contact    Best call back number: [phone]    What orders are you requesting (i.e. lab or imaging): BLOOD LAB ORDER FOR UPCOMING APPT INCLUDING A1C    In what timeframe would the patient need to come in: BEFORE 04/25/2024    Where will you receive your lab/imaging services: [facility]    Additional notes: PATIENT WOULD LIKE TO HAVE HIS LABS DRAWN BEFORE HIS APPT. PLEASE PUT ORDERS IN THE SYSTEM AND CALL TO LET THEM KNOW WHEN THESE ARE IN THE SYSTEM.

## 2024-03-29 NOTE — TELEPHONE ENCOUNTER
Noted.  I have placed orders for fasting blood work to be done at his convenience in the next 2 weeks.  Thanks.

## 2024-04-06 DIAGNOSIS — E11.65 TYPE 2 DIABETES MELLITUS WITH HYPERGLYCEMIA, WITHOUT LONG-TERM CURRENT USE OF INSULIN: ICD-10-CM

## 2024-04-06 DIAGNOSIS — I10 ESSENTIAL HYPERTENSION: ICD-10-CM

## 2024-04-06 DIAGNOSIS — N18.31 CHRONIC KIDNEY DISEASE, STAGE 3A: ICD-10-CM

## 2024-04-08 RX ORDER — GLIMEPIRIDE 2 MG/1
2 TABLET ORAL DAILY
Qty: 90 TABLET | Refills: 0 | Status: SHIPPED | OUTPATIENT
Start: 2024-04-08

## 2024-04-08 RX ORDER — METOPROLOL SUCCINATE 25 MG/1
25 TABLET, EXTENDED RELEASE ORAL DAILY
Qty: 90 TABLET | Refills: 0 | Status: SHIPPED | OUTPATIENT
Start: 2024-04-08

## 2024-04-11 ENCOUNTER — LAB (OUTPATIENT)
Dept: LAB | Facility: HOSPITAL | Age: 69
End: 2024-04-11
Payer: MEDICARE

## 2024-04-11 DIAGNOSIS — E78.2 MIXED HYPERLIPIDEMIA: ICD-10-CM

## 2024-04-11 DIAGNOSIS — Z79.899 OTHER LONG TERM (CURRENT) DRUG THERAPY: ICD-10-CM

## 2024-04-11 DIAGNOSIS — I10 ESSENTIAL HYPERTENSION: ICD-10-CM

## 2024-04-11 DIAGNOSIS — Z12.5 PROSTATE CANCER SCREENING: ICD-10-CM

## 2024-04-11 DIAGNOSIS — E11.65 TYPE 2 DIABETES MELLITUS WITH HYPERGLYCEMIA, WITHOUT LONG-TERM CURRENT USE OF INSULIN: ICD-10-CM

## 2024-04-11 DIAGNOSIS — N18.31 CHRONIC KIDNEY DISEASE, STAGE 3A: ICD-10-CM

## 2024-04-11 LAB
ALBUMIN SERPL-MCNC: 4.3 G/DL (ref 3.5–5.2)
ALBUMIN UR-MCNC: 1.6 MG/DL
ALBUMIN/GLOB SERPL: 1.5 G/DL
ALP SERPL-CCNC: 80 U/L (ref 39–117)
ALT SERPL W P-5'-P-CCNC: 23 U/L (ref 1–41)
ANION GAP SERPL CALCULATED.3IONS-SCNC: 12.9 MMOL/L (ref 5–15)
AST SERPL-CCNC: 16 U/L (ref 1–40)
BACTERIA UR QL AUTO: NORMAL /HPF
BILIRUB SERPL-MCNC: 0.6 MG/DL (ref 0–1.2)
BILIRUB UR QL STRIP: NEGATIVE
BUN SERPL-MCNC: 19 MG/DL (ref 8–23)
BUN/CREAT SERPL: 17.4 (ref 7–25)
CALCIUM SPEC-SCNC: 8.8 MG/DL (ref 8.6–10.5)
CHLORIDE SERPL-SCNC: 101 MMOL/L (ref 98–107)
CHOLEST SERPL-MCNC: 197 MG/DL (ref 0–200)
CLARITY UR: CLEAR
CO2 SERPL-SCNC: 25.1 MMOL/L (ref 22–29)
COLOR UR: YELLOW
CREAT SERPL-MCNC: 1.09 MG/DL (ref 0.76–1.27)
DEPRECATED RDW RBC AUTO: 42.1 FL (ref 37–54)
EGFRCR SERPLBLD CKD-EPI 2021: 73.9 ML/MIN/1.73
ERYTHROCYTE [DISTWIDTH] IN BLOOD BY AUTOMATED COUNT: 12.2 % (ref 12.3–15.4)
GLOBULIN UR ELPH-MCNC: 2.9 GM/DL
GLUCOSE SERPL-MCNC: 137 MG/DL (ref 65–99)
GLUCOSE UR STRIP-MCNC: ABNORMAL MG/DL
HBA1C MFR BLD: 7.4 % (ref 4.8–5.6)
HCT VFR BLD AUTO: 52.6 % (ref 37.5–51)
HDLC SERPL-MCNC: 48 MG/DL (ref 40–60)
HGB BLD-MCNC: 17.3 G/DL (ref 13–17.7)
HGB UR QL STRIP.AUTO: NEGATIVE
KETONES UR QL STRIP: NEGATIVE
LDLC SERPL CALC-MCNC: 125 MG/DL (ref 0–100)
LDLC/HDLC SERPL: 2.55 {RATIO}
LEUKOCYTE ESTERASE UR QL STRIP.AUTO: NEGATIVE
MCH RBC QN AUTO: 30.5 PG (ref 26.6–33)
MCHC RBC AUTO-ENTMCNC: 32.9 G/DL (ref 31.5–35.7)
MCV RBC AUTO: 92.6 FL (ref 79–97)
NITRITE UR QL STRIP: NEGATIVE
PH UR STRIP.AUTO: 5.5 [PH] (ref 5–8)
PLATELET # BLD AUTO: 213 10*3/MM3 (ref 140–450)
PMV BLD AUTO: 11 FL (ref 6–12)
POTASSIUM SERPL-SCNC: 3.9 MMOL/L (ref 3.5–5.2)
PROT SERPL-MCNC: 7.2 G/DL (ref 6–8.5)
PROT UR QL STRIP: NEGATIVE
PSA SERPL-MCNC: 0.38 NG/ML (ref 0–4)
RBC # BLD AUTO: 5.68 10*6/MM3 (ref 4.14–5.8)
RBC # UR STRIP: NORMAL /HPF
REF LAB TEST METHOD: NORMAL
SODIUM SERPL-SCNC: 139 MMOL/L (ref 136–145)
SP GR UR STRIP: 1.02 (ref 1–1.03)
SQUAMOUS #/AREA URNS HPF: NORMAL /HPF
TRIGL SERPL-MCNC: 133 MG/DL (ref 0–150)
UROBILINOGEN UR QL STRIP: ABNORMAL
VLDLC SERPL-MCNC: 24 MG/DL (ref 5–40)
WBC # UR STRIP: NORMAL /HPF
WBC NRBC COR # BLD AUTO: 6.28 10*3/MM3 (ref 3.4–10.8)

## 2024-04-11 PROCEDURE — 82043 UR ALBUMIN QUANTITATIVE: CPT

## 2024-04-11 PROCEDURE — 81001 URINALYSIS AUTO W/SCOPE: CPT

## 2024-04-11 PROCEDURE — 83036 HEMOGLOBIN GLYCOSYLATED A1C: CPT

## 2024-04-11 PROCEDURE — G0103 PSA SCREENING: HCPCS

## 2024-04-11 PROCEDURE — 80061 LIPID PANEL: CPT

## 2024-04-11 PROCEDURE — 36415 COLL VENOUS BLD VENIPUNCTURE: CPT

## 2024-04-11 PROCEDURE — 80053 COMPREHEN METABOLIC PANEL: CPT

## 2024-04-11 PROCEDURE — 85027 COMPLETE CBC AUTOMATED: CPT

## 2024-04-25 ENCOUNTER — OFFICE VISIT (OUTPATIENT)
Dept: FAMILY MEDICINE CLINIC | Age: 69
End: 2024-04-25
Payer: MEDICARE

## 2024-04-25 VITALS
SYSTOLIC BLOOD PRESSURE: 134 MMHG | OXYGEN SATURATION: 96 % | BODY MASS INDEX: 29.48 KG/M2 | WEIGHT: 222.4 LBS | DIASTOLIC BLOOD PRESSURE: 73 MMHG | HEART RATE: 88 BPM | TEMPERATURE: 97.7 F | HEIGHT: 73 IN

## 2024-04-25 DIAGNOSIS — N18.31 CHRONIC KIDNEY DISEASE, STAGE 3A: ICD-10-CM

## 2024-04-25 DIAGNOSIS — I10 ESSENTIAL HYPERTENSION: ICD-10-CM

## 2024-04-25 DIAGNOSIS — E11.65 TYPE 2 DIABETES MELLITUS WITH HYPERGLYCEMIA, WITHOUT LONG-TERM CURRENT USE OF INSULIN: Primary | ICD-10-CM

## 2024-04-25 PROCEDURE — 3051F HG A1C>EQUAL 7.0%<8.0%: CPT | Performed by: FAMILY MEDICINE

## 2024-04-25 PROCEDURE — 99214 OFFICE O/P EST MOD 30 MIN: CPT | Performed by: FAMILY MEDICINE

## 2024-04-25 PROCEDURE — 3078F DIAST BP <80 MM HG: CPT | Performed by: FAMILY MEDICINE

## 2024-04-25 PROCEDURE — 1159F MED LIST DOCD IN RCRD: CPT | Performed by: FAMILY MEDICINE

## 2024-04-25 PROCEDURE — 1160F RVW MEDS BY RX/DR IN RCRD: CPT | Performed by: FAMILY MEDICINE

## 2024-04-25 PROCEDURE — G2211 COMPLEX E/M VISIT ADD ON: HCPCS | Performed by: FAMILY MEDICINE

## 2024-04-25 PROCEDURE — 3075F SYST BP GE 130 - 139MM HG: CPT | Performed by: FAMILY MEDICINE

## 2024-04-25 RX ORDER — METFORMIN HYDROCHLORIDE 500 MG/1
1000 TABLET, EXTENDED RELEASE ORAL
Qty: 180 TABLET | Refills: 3 | Status: SHIPPED | OUTPATIENT
Start: 2024-04-25

## 2024-04-25 RX ORDER — METOPROLOL SUCCINATE 25 MG/1
25 TABLET, EXTENDED RELEASE ORAL DAILY
Qty: 90 TABLET | Refills: 3 | Status: SHIPPED | OUTPATIENT
Start: 2024-04-25

## 2024-04-25 RX ORDER — GLIMEPIRIDE 2 MG/1
2 TABLET ORAL DAILY
Qty: 90 TABLET | Refills: 3 | Status: SHIPPED | OUTPATIENT
Start: 2024-04-25

## 2024-04-25 NOTE — PROGRESS NOTES
Kang Mantilla presents to [facility].    Chief Complaint:  Diabetes, blood pressure    Subjective   History of Present Illness:  Kang is being seen today for follow-up on his care.  He has type 2 diabetes for which she remains on treatments as noted.  He states that his blood sugar has been running around 135 in the mornings.  His recent A1c was 7.4%.  He attributes that to being less active during the winter months.  He denies any low blood sugars.    He also has hypertension and elevated cholesterol.  He is unfortunately not able to tolerate statin therapy well.  His blood pressure is reasonably well-controlled here.  His pressure runs in a good range at home.    Review of Systems:  Review of Systems   Constitutional:  Negative for chills and fever.   Respiratory:  Negative for cough and shortness of breath.    Cardiovascular:  Negative for chest pain and palpitations.   Gastrointestinal:  Negative for abdominal pain, nausea and vomiting.      Objective   Medical History:  Past Medical History:    Chronic kidney disease, stage 3a    Colon polyps    FOLLOWED BY DR.NECHOL ROWAN    Essential hypertension    Type 2 diabetes mellitus with hyperglycemia     Past Surgical History:    COLONOSCOPY    5 MM TUBULAR ADENOMA POLYP IN TRANSVERSE, 6 MM TUBULAR ADENOMA POLYP IN DESCENDING, 2 TUBULAR ADENOMA POLYPS IN DISTAL DESCENDING, 5 MM  TUBULAR ADENOMA POLYP IN SIGMOID, 1 TUBULAR ADENOMA POLYP IN RECTUM, 1 BENIGN POLYP IN RECTUM, MULTIPLE DIVERTICULA IN SIGMOID, RESCOPE IN 3 YRS, DR. KASSANDRA ROWAN AT [facility]    COLONOSCOPY    Procedure: COLONOSCOPY to cecum with hot snare polypectomies;  Surgeon: Kassandra Rowan MD;  Location: [facility] ENDOSCOPY;  Service: General;  Laterality: N/A;  pre-hx polyps  post-polyps, diverticulosis    JOINT REPLACEMENT    Right knee      Family History   Problem Relation Age of Onset    Malig Hyperthermia Neg Hx      Social History     Tobacco Use    Smoking status: Never     Smokeless tobacco: Never   Substance Use Topics    Alcohol use: Yes     Alcohol/week: 12.0 standard drinks of alcohol     Types: 12 Cans of beer per week     Comment: WEEKLY       Health Maintenance Due   Topic Date Due    TDAP/TD VACCINES (1 - Tdap) Never done    RSV Vaccine - Adults (1 - 1-dose 60+ series) Never done        Immunization History   Administered Date(s) Administered    COVID-19 (MODERNA) 1st,2nd,3rd Dose Monovalent 03/04/2021, 04/02/2021, 12/01/2021    Flu Vaccine Intradermal Quad 18-64YR 10/14/2021    Flucelvax Quad Vial =>4yrs 10/01/2021    Fluzone High-Dose 65+yrs 10/14/2022, 11/20/2023    Influenza Injectable Mdck Pf Quad 10/14/2021    Influenza Quad Vaccine (Inpatient) 11/03/2019    Influenza TIV (IM) 11/03/2019    Influenza, Unspecified 10/14/2021    Pneumococcal Conjugate 20-Valent (PCV20) 08/26/2022    Shingrix 12/03/2016, 06/02/2017       Allergies   Allergen Reactions    Atorvastatin Other (See Comments)     weakness    Lisinopril Cough        Medications:  Current Outpatient Medications on File Prior to Visit   Medication Sig    Blood Glucose Monitoring Suppl (True Metrix Meter) w/Device kit 1 kit Daily. Check blood sugar daily  DX E11.9    glucose blood (True Metrix Blood Glucose Test) test strip CHECK BLOOD SUGAR ONE TIME DAILY    TRUEplus Lancets 30G misc CHECK BLOOD SUGAR EVERY DAY    [DISCONTINUED] empagliflozin (Jardiance) 25 MG tablet tablet Take 1 tablet by mouth Daily.    [DISCONTINUED] glimepiride (AMARYL) 2 MG tablet Take 1 tablet by mouth Daily.    [DISCONTINUED] metFORMIN ER (GLUCOPHAGE-XR) 500 MG 24 hr tablet Take 2 tablets by mouth Daily With Breakfast.    [DISCONTINUED] metoprolol succinate XL (TOPROL-XL) 25 MG 24 hr tablet Take 1 tablet by mouth Daily.     No current facility-administered medications on file prior to visit.       Vital Signs:   /73 (BP Location: Right arm, Patient Position: Sitting)   Pulse 88   Temp 97.7 °F (36.5 °C) (Oral)   Ht 185.4 cm  "(72.99\")   Wt 101 kg (222 lb 6.4 oz)   SpO2 96%   BMI 29.35 kg/m²       Physical Exam:  Physical Exam  Vitals reviewed.   Constitutional:       General: He is not in acute distress.     Appearance: He is not ill-appearing.   Eyes:      Pupils: Pupils are equal, round, and reactive to light.   Neck:      Comments: No thyromegaly  Cardiovascular:      Rate and Rhythm: Normal rate and regular rhythm.      Pulses:           Dorsalis pedis pulses are 2+ on the right side and 2+ on the left side.   Pulmonary:      Effort: Pulmonary effort is normal.      Breath sounds: Normal breath sounds.   Abdominal:      General: There is no distension.      Palpations: Abdomen is soft.      Tenderness: There is no abdominal tenderness.   Musculoskeletal:      Cervical back: Neck supple.   Feet:      Right foot:      Protective Sensation: 3 sites tested.  3 sites sensed.      Skin integrity: Callus present.      Left foot:      Protective Sensation: 3 sites tested.  3 sites sensed.      Skin integrity: Callus present.   Lymphadenopathy:      Cervical: No cervical adenopathy.   Skin:     Findings: No lesion or rash.   Neurological:      Mental Status: He is alert.     Result Review   The following data was reviewed by Maverick Robins MD on 04/25/2024.  Lab Results   Component Value Date    WBC 6.28 04/11/2024    HGB 17.3 04/11/2024    HCT 52.6 (H) 04/11/2024    MCV 92.6 04/11/2024     04/11/2024     Lab Results   Component Value Date    GLUCOSE 137 (H) 04/11/2024    BUN 19 04/11/2024    CREATININE 1.09 04/11/2024     04/11/2024    K 3.9 04/11/2024     04/11/2024    CO2 25.1 04/11/2024    CALCIUM 8.8 04/11/2024    PROTEINTOT 7.2 04/11/2024    ALBUMIN 4.3 04/11/2024    ALT 23 04/11/2024    AST 16 04/11/2024    ALKPHOS 80 04/11/2024    BILITOT 0.6 04/11/2024    EGFR 73.9 04/11/2024    GLOB 2.9 04/11/2024    AGRATIO 1.5 04/11/2024    BCR 17.4 04/11/2024    ANIONGAP 12.9 04/11/2024      Lab Results   Component " Value Date    CHOL 197 04/11/2024    CHLPL 192 04/06/2021    TRIG 133 04/11/2024    HDL 48 04/11/2024     (H) 04/11/2024     Lab Results   Component Value Date    TSH 2.570 08/04/2023     Lab Results   Component Value Date    HGBA1C 7.40 (H) 04/11/2024     Lab Results   Component Value Date    PSA 0.376 04/11/2024    PSA 0.579 01/16/2023    PSA 0.683 11/23/2021     BMI is >= 25 and <30. (Overweight) The following options were offered after discussion;: exercise counseling/recommendations and nutrition counseling/recommendations         Assessment and Plan:   Today, we have reviewed his care.  Kang seems well overall.  His A1c was mildly elevated on recent check.  We will make no change with medication for the near term.  We will reach out to him again in about 3 months to recheck it.  He intends to be more active in the months ahead.  He does have some degree of diabetic neuropathy.  It is not overly intrusive right now.  We discussed that there is no cure for this.  If he does have significant pain that is keeping him up, we could consider medication for it.  I would not recommend treatment in the near term though.  We will leave this door open though.  Otherwise, we will refill his medications and tentatively plan to see him back in about 6 months.  Vaccines were reviewed today.    Diagnoses and all orders for this visit:    1. Type 2 diabetes mellitus with hyperglycemia, without long-term current use of insulin (Primary)  -     empagliflozin (Jardiance) 25 MG tablet tablet; Take 1 tablet by mouth Daily.  Dispense: 90 tablet; Refill: 3  -     glimepiride (AMARYL) 2 MG tablet; Take 1 tablet by mouth Daily.  Dispense: 90 tablet; Refill: 3  -     metFORMIN ER (GLUCOPHAGE-XR) 500 MG 24 hr tablet; Take 2 tablets by mouth Daily With Breakfast.  Dispense: 180 tablet; Refill: 3    2. Essential hypertension  -     metoprolol succinate XL (TOPROL-XL) 25 MG 24 hr tablet; Take 1 tablet by mouth Daily.  Dispense: 90  tablet; Refill: 3    3. Chronic kidney disease, stage 3a  -     metoprolol succinate XL (TOPROL-XL) 25 MG 24 hr tablet; Take 1 tablet by mouth Daily.  Dispense: 90 tablet; Refill: 3    Follow Up  Return in about 6 months (around 10/25/2024) for Recheck, Medicare Wellness.  Patient was given instructions and counseling regarding his condition or for health maintenance advice. Please see specific information pulled into the AVS if appropriate.

## 2024-07-12 ENCOUNTER — TELEPHONE (OUTPATIENT)
Dept: FAMILY MEDICINE CLINIC | Age: 69
End: 2024-07-12
Payer: MEDICARE

## 2024-07-12 NOTE — TELEPHONE ENCOUNTER
----- Message from Niecy FAIRCHILD sent at 4/25/2024 11:51 AM EDT -----  TICKLE to call him after 7/11/2024 for fingerstick A1c.

## 2024-07-15 ENCOUNTER — CLINICAL SUPPORT (OUTPATIENT)
Dept: FAMILY MEDICINE CLINIC | Age: 69
End: 2024-07-15
Payer: MEDICARE

## 2024-07-15 DIAGNOSIS — E11.65 TYPE 2 DIABETES MELLITUS WITH HYPERGLYCEMIA, WITHOUT LONG-TERM CURRENT USE OF INSULIN: Primary | ICD-10-CM

## 2024-07-15 LAB
EXPIRATION DATE: ABNORMAL
HBA1C MFR BLD: 6.6 % (ref 4.5–5.7)
Lab: ABNORMAL

## 2024-07-15 PROCEDURE — 3044F HG A1C LEVEL LT 7.0%: CPT | Performed by: FAMILY MEDICINE

## 2024-07-15 PROCEDURE — 83036 HEMOGLOBIN GLYCOSYLATED A1C: CPT | Performed by: FAMILY MEDICINE

## 2024-09-16 ENCOUNTER — CLINICAL SUPPORT (OUTPATIENT)
Dept: FAMILY MEDICINE CLINIC | Age: 69
End: 2024-09-16
Payer: MEDICARE

## 2024-09-16 DIAGNOSIS — Z23 NEED FOR INFLUENZA VACCINATION: Primary | ICD-10-CM

## 2024-09-16 PROCEDURE — G0008 ADMIN INFLUENZA VIRUS VAC: HCPCS | Performed by: FAMILY MEDICINE

## 2024-09-16 PROCEDURE — 90662 IIV NO PRSV INCREASED AG IM: CPT | Performed by: FAMILY MEDICINE

## 2024-09-29 DIAGNOSIS — E11.65 TYPE 2 DIABETES MELLITUS WITH HYPERGLYCEMIA, WITHOUT LONG-TERM CURRENT USE OF INSULIN: Primary | ICD-10-CM

## 2024-09-30 RX ORDER — GLUCOSAM/CHON-MSM1/C/MANG/BOSW 500-416.6
TABLET ORAL
Qty: 100 EACH | Refills: 0 | Status: SHIPPED | OUTPATIENT
Start: 2024-09-30

## 2024-09-30 RX ORDER — CALCIUM CITRATE/VITAMIN D3 200MG-6.25
1 TABLET ORAL DAILY
Qty: 100 EACH | Refills: 0 | Status: SHIPPED | OUTPATIENT
Start: 2024-09-30

## 2024-12-03 ENCOUNTER — TELEPHONE (OUTPATIENT)
Dept: FAMILY MEDICINE CLINIC | Age: 69
End: 2024-12-03
Payer: MEDICARE

## 2024-12-03 DIAGNOSIS — E78.2 MIXED HYPERLIPIDEMIA: ICD-10-CM

## 2024-12-03 DIAGNOSIS — Z79.899 OTHER LONG TERM (CURRENT) DRUG THERAPY: ICD-10-CM

## 2024-12-03 DIAGNOSIS — E11.65 TYPE 2 DIABETES MELLITUS WITH HYPERGLYCEMIA, WITHOUT LONG-TERM CURRENT USE OF INSULIN: Primary | ICD-10-CM

## 2024-12-03 DIAGNOSIS — N18.31 CHRONIC KIDNEY DISEASE, STAGE 3A: ICD-10-CM

## 2024-12-03 DIAGNOSIS — I10 ESSENTIAL HYPERTENSION: ICD-10-CM

## 2024-12-03 NOTE — TELEPHONE ENCOUNTER
Caller: DOLORES CLAY    Relationship: Emergency Contact    Best call back number: [phone]    What is the best time to reach you: ANYTIME    Who are you requesting to speak with (clinical staff, provider,  specific staff member): NURSE       What was the call regarding: PATIENT IS CALLING WANTING TO KNOW IF HE IS TO HAVE LABS DONE BEFORE HIS APPOINTMENT ON 12/13.  IF SO PLEASE ADVISE HIM.

## 2024-12-04 NOTE — TELEPHONE ENCOUNTER
Please let Kang know that I have placed orders for labs to be done at his convenience.  It would be ideal for him to be fasting when he completes these labs.  He should have ample fluids such as water or black coffee though prior to coming.  Let me know if he has other concerns.  Thanks.

## 2024-12-05 ENCOUNTER — LAB (OUTPATIENT)
Dept: LAB | Facility: HOSPITAL | Age: 69
End: 2024-12-05
Payer: MEDICARE

## 2024-12-05 DIAGNOSIS — N18.31 CHRONIC KIDNEY DISEASE, STAGE 3A: ICD-10-CM

## 2024-12-05 DIAGNOSIS — Z79.899 OTHER LONG TERM (CURRENT) DRUG THERAPY: ICD-10-CM

## 2024-12-05 DIAGNOSIS — I10 ESSENTIAL HYPERTENSION: ICD-10-CM

## 2024-12-05 DIAGNOSIS — E78.2 MIXED HYPERLIPIDEMIA: ICD-10-CM

## 2024-12-05 DIAGNOSIS — E11.65 TYPE 2 DIABETES MELLITUS WITH HYPERGLYCEMIA, WITHOUT LONG-TERM CURRENT USE OF INSULIN: ICD-10-CM

## 2024-12-05 LAB
ALBUMIN SERPL-MCNC: 4 G/DL (ref 3.5–5.2)
ALBUMIN/GLOB SERPL: 1.1 G/DL
ALP SERPL-CCNC: 83 U/L (ref 39–117)
ALT SERPL W P-5'-P-CCNC: 33 U/L (ref 1–41)
ANION GAP SERPL CALCULATED.3IONS-SCNC: 11.3 MMOL/L (ref 5–15)
AST SERPL-CCNC: 21 U/L (ref 1–40)
BACTERIA UR QL AUTO: NORMAL /HPF
BILIRUB SERPL-MCNC: 0.6 MG/DL (ref 0–1.2)
BILIRUB UR QL STRIP: NEGATIVE
BUN SERPL-MCNC: 18 MG/DL (ref 8–23)
BUN/CREAT SERPL: 16.5 (ref 7–25)
CALCIUM SPEC-SCNC: 9.1 MG/DL (ref 8.6–10.5)
CHLORIDE SERPL-SCNC: 101 MMOL/L (ref 98–107)
CHOLEST SERPL-MCNC: 211 MG/DL (ref 0–200)
CLARITY UR: CLEAR
CO2 SERPL-SCNC: 26.7 MMOL/L (ref 22–29)
COLOR UR: YELLOW
CREAT SERPL-MCNC: 1.09 MG/DL (ref 0.76–1.27)
DEPRECATED RDW RBC AUTO: 42.2 FL (ref 37–54)
EGFRCR SERPLBLD CKD-EPI 2021: 73.5 ML/MIN/1.73
ERYTHROCYTE [DISTWIDTH] IN BLOOD BY AUTOMATED COUNT: 12.4 % (ref 12.3–15.4)
GLOBULIN UR ELPH-MCNC: 3.7 GM/DL
GLUCOSE SERPL-MCNC: 150 MG/DL (ref 65–99)
GLUCOSE UR STRIP-MCNC: ABNORMAL MG/DL
HBA1C MFR BLD: 6.9 % (ref 4.8–5.6)
HCT VFR BLD AUTO: 53.2 % (ref 37.5–51)
HDLC SERPL-MCNC: 47 MG/DL (ref 40–60)
HGB BLD-MCNC: 17.5 G/DL (ref 13–17.7)
HGB UR QL STRIP.AUTO: NEGATIVE
KETONES UR QL STRIP: NEGATIVE
LDLC SERPL CALC-MCNC: 139 MG/DL (ref 0–100)
LDLC/HDLC SERPL: 2.89 {RATIO}
LEUKOCYTE ESTERASE UR QL STRIP.AUTO: NEGATIVE
MCH RBC QN AUTO: 30.3 PG (ref 26.6–33)
MCHC RBC AUTO-ENTMCNC: 32.9 G/DL (ref 31.5–35.7)
MCV RBC AUTO: 92 FL (ref 79–97)
NITRITE UR QL STRIP: NEGATIVE
PH UR STRIP.AUTO: 5.5 [PH] (ref 5–8)
PLATELET # BLD AUTO: 195 10*3/MM3 (ref 140–450)
PMV BLD AUTO: 11 FL (ref 6–12)
POTASSIUM SERPL-SCNC: 3.9 MMOL/L (ref 3.5–5.2)
PROT SERPL-MCNC: 7.7 G/DL (ref 6–8.5)
PROT UR QL STRIP: NEGATIVE
RBC # BLD AUTO: 5.78 10*6/MM3 (ref 4.14–5.8)
RBC # UR STRIP: NORMAL /HPF
REF LAB TEST METHOD: NORMAL
SODIUM SERPL-SCNC: 139 MMOL/L (ref 136–145)
SP GR UR STRIP: 1.02 (ref 1–1.03)
SQUAMOUS #/AREA URNS HPF: NORMAL /HPF
TRIGL SERPL-MCNC: 141 MG/DL (ref 0–150)
TSH SERPL DL<=0.05 MIU/L-ACNC: 2.69 UIU/ML (ref 0.27–4.2)
UROBILINOGEN UR QL STRIP: ABNORMAL
VLDLC SERPL-MCNC: 25 MG/DL (ref 5–40)
WBC # UR STRIP: NORMAL /HPF
WBC NRBC COR # BLD AUTO: 7.91 10*3/MM3 (ref 3.4–10.8)

## 2024-12-05 PROCEDURE — 84443 ASSAY THYROID STIM HORMONE: CPT

## 2024-12-05 PROCEDURE — 80061 LIPID PANEL: CPT

## 2024-12-05 PROCEDURE — 81001 URINALYSIS AUTO W/SCOPE: CPT

## 2024-12-05 PROCEDURE — 80053 COMPREHEN METABOLIC PANEL: CPT

## 2024-12-05 PROCEDURE — 36415 COLL VENOUS BLD VENIPUNCTURE: CPT

## 2024-12-05 PROCEDURE — 85027 COMPLETE CBC AUTOMATED: CPT

## 2024-12-05 PROCEDURE — 83036 HEMOGLOBIN GLYCOSYLATED A1C: CPT

## 2024-12-12 DIAGNOSIS — E11.65 TYPE 2 DIABETES MELLITUS WITH HYPERGLYCEMIA, WITHOUT LONG-TERM CURRENT USE OF INSULIN: ICD-10-CM

## 2024-12-12 RX ORDER — GLUCOSAM/CHON-MSM1/C/MANG/BOSW 500-416.6
TABLET ORAL
Qty: 100 EACH | Refills: 3 | Status: SHIPPED | OUTPATIENT
Start: 2024-12-12

## 2024-12-12 RX ORDER — CALCIUM CITRATE/VITAMIN D3 200MG-6.25
1 TABLET ORAL DAILY
Qty: 100 EACH | Refills: 3 | Status: SHIPPED | OUTPATIENT
Start: 2024-12-12

## 2024-12-13 ENCOUNTER — TELEPHONE (OUTPATIENT)
Dept: FAMILY MEDICINE CLINIC | Age: 69
End: 2024-12-13

## 2024-12-13 ENCOUNTER — OFFICE VISIT (OUTPATIENT)
Dept: FAMILY MEDICINE CLINIC | Age: 69
End: 2024-12-13
Payer: MEDICARE

## 2024-12-13 VITALS
HEIGHT: 73 IN | WEIGHT: 221.8 LBS | HEART RATE: 93 BPM | DIASTOLIC BLOOD PRESSURE: 70 MMHG | TEMPERATURE: 97.7 F | BODY MASS INDEX: 29.4 KG/M2 | OXYGEN SATURATION: 97 % | SYSTOLIC BLOOD PRESSURE: 113 MMHG

## 2024-12-13 DIAGNOSIS — E78.2 MIXED HYPERLIPIDEMIA: ICD-10-CM

## 2024-12-13 DIAGNOSIS — Z00.00 PHYSICAL EXAM: Primary | ICD-10-CM

## 2024-12-13 DIAGNOSIS — N18.31 CHRONIC KIDNEY DISEASE, STAGE 3A: ICD-10-CM

## 2024-12-13 DIAGNOSIS — I10 ESSENTIAL HYPERTENSION: ICD-10-CM

## 2024-12-13 DIAGNOSIS — Z13.6 ENCOUNTER FOR SCREENING FOR CORONARY ARTERY DISEASE: ICD-10-CM

## 2024-12-13 DIAGNOSIS — E11.65 TYPE 2 DIABETES MELLITUS WITH HYPERGLYCEMIA, WITHOUT LONG-TERM CURRENT USE OF INSULIN: ICD-10-CM

## 2024-12-13 DIAGNOSIS — Z82.49 FH: CAD (CORONARY ARTERY DISEASE): ICD-10-CM

## 2024-12-13 RX ORDER — GLIMEPIRIDE 2 MG/1
2 TABLET ORAL DAILY
Qty: 90 TABLET | Refills: 3 | Status: SHIPPED | OUTPATIENT
Start: 2024-12-13

## 2024-12-13 RX ORDER — METOPROLOL SUCCINATE 25 MG/1
25 TABLET, EXTENDED RELEASE ORAL DAILY
Qty: 90 TABLET | Refills: 3 | Status: SHIPPED | OUTPATIENT
Start: 2024-12-13

## 2024-12-13 RX ORDER — METFORMIN HYDROCHLORIDE 500 MG/1
1000 TABLET, EXTENDED RELEASE ORAL
Qty: 180 TABLET | Refills: 3 | Status: SHIPPED | OUTPATIENT
Start: 2024-12-13

## 2024-12-13 NOTE — PROGRESS NOTES
The ABCs of the Annual Wellness Visit  Subsequent Medicare Wellness Visit    Subjective    Kang Mantilla is a 69 y.o. patient who presents for a Subsequent Medicare Wellness Visit.    The following portions of the patient's history were reviewed and updated as appropriate: allergies, current medications, past family history, past medical history, past social history, past surgical history, and problem list.    Compared to one year ago, the patient feels his physical health is the same.    Compared to one year ago, the patient feels his mental health is the same.    Recent Hospitalizations:  He was not admitted to the hospital during the last year.     Current Medical Providers:  Patient Care Team:  Maverick Robins MD as PCP - General (Family Medicine)    Outpatient Medications Prior to Visit   Medication Sig Dispense Refill    Blood Glucose Monitoring Suppl (True Metrix Meter) w/Device kit 1 kit Daily. Check blood sugar daily  DX E11.9 1 kit 0    glucose blood (True Metrix Blood Glucose Test) test strip 1 each by Other route Daily. 100 each 3    TRUEplus Lancets 30G misc CHECK BLOOD SUGAR EVERY  each 3    empagliflozin (Jardiance) 25 MG tablet tablet Take 1 tablet by mouth Daily. 90 tablet 3    glimepiride (AMARYL) 2 MG tablet Take 1 tablet by mouth Daily. 90 tablet 3    metFORMIN ER (GLUCOPHAGE-XR) 500 MG 24 hr tablet Take 2 tablets by mouth Daily With Breakfast. 180 tablet 3    metoprolol succinate XL (TOPROL-XL) 25 MG 24 hr tablet Take 1 tablet by mouth Daily. 90 tablet 3     No facility-administered medications prior to visit.       No opioid medication identified on active medication list. I have reviewed chart for other potential  high risk medication/s and harmful drug interactions in the elderly.      Aspirin is not on active medication list.  Aspirin use is not indicated based on review of current medical condition/s. Risk of harm outweighs potential benefits.  His wife, Mary Ogden, would make  "decisions if needed.    Patient Active Problem List   Diagnosis    Type 2 diabetes mellitus with hyperglycemia    Chronic kidney disease, stage 3a    Essential hypertension    Mixed hyperlipidemia    History of colon polyps    Colon polyps     Advance Care Planning  Advance Directive is not on file.  ACP discussion was held with the patient during this visit. Patient has an advance directive (not in EMR), copy requested.     Objective    Vitals:    24 1333   BP: 113/70   BP Location: Right arm   Patient Position: Sitting   Pulse: 93   Temp: 97.7 °F (36.5 °C)   TempSrc: Oral   SpO2: 97%   Weight: 101 kg (221 lb 12.8 oz)   Height: 185.4 cm (72.99\")   PainSc: 0-No pain     Estimated body mass index is 29.27 kg/m² as calculated from the following:    Height as of this encounter: 185.4 cm (72.99\").    Weight as of this encounter: 101 kg (221 lb 12.8 oz).    BMI is >= 25 and <30. (Overweight) The following options were offered after discussion;: exercise counseling/recommendations and nutrition counseling/recommendations    Does the patient have evidence of cognitive impairment? No    Lab Results   Component Value Date    TRIG 141 2024    HDL 47 2024     (H) 2024    VLDL 25 2024    HGBA1C 6.90 (H) 2024        HEALTH RISK ASSESSMENT    Smoking Status:  Social History     Tobacco Use   Smoking Status Never   Smokeless Tobacco Never     Alcohol Consumption:  Social History     Substance and Sexual Activity   Alcohol Use Yes    Alcohol/week: 12.0 standard drinks of alcohol    Types: 12 Cans of beer per week    Comment: WEEKLY     Fall Risk Screen:    STEADI Fall Risk Assessment was completed, and patient is at LOW risk for falls.Assessment completed on:2024    Depression Screenin/13/2024     1:33 PM   PHQ-2/PHQ-9 Depression Screening   Little interest or pleasure in doing things Not at all   Feeling down, depressed, or hopeless Not at all   How difficult have these " problems made it for you to do your work, take care of things at home, or get along with other people? Not difficult at all       Health Habits and Functional and Cognitive Screenin/13/2024     1:34 PM   Functional & Cognitive Status   Do you have difficulty preparing food and eating? No   Do you have difficulty bathing yourself, getting dressed or grooming yourself? No   Do you have difficulty using the toilet? No   Do you have difficulty moving around from place to place? No   Do you have trouble with steps or getting out of a bed or a chair? No   Current Diet Well Balanced Diet   Dental Exam Up to date   Eye Exam Up to date   Exercise (times per week) 0 times per week   Current Exercises Include No Regular Exercise   Do you need help using the phone?  No   Are you deaf or do you have serious difficulty hearing?  No   Do you need help to go to places out of walking distance? No   Do you need help shopping? No   Do you need help preparing meals?  No   Do you need help with housework?  No   Do you need help with laundry? No   Do you need help taking your medications? No   Do you need help managing money? No   Do you ever drive or ride in a car without wearing a seat belt? No   Have you felt unusual stress, anger or loneliness in the last month? No   Who do you live with? Spouse   If you need help, do you have trouble finding someone available to you? No   Have you been bothered in the last four weeks by sexual problems? No   Do you have difficulty concentrating, remembering or making decisions? No     Age-appropriate Screening Schedule:  Refer to the list below for future screening recommendations based on patient's age, sex and/or medical conditions. Orders for these recommended tests are listed in the plan section. The patient has been provided with a written plan.    Health Maintenance   Topic Date Due    DIABETIC EYE EXAM  2024    COLORECTAL CANCER SCREENING  2025    COVID-19 Vaccine (4 -  2024-25 season) 12/13/2025 (Originally 9/1/2024)    URINE MICROALBUMIN  04/11/2025    DIABETIC FOOT EXAM  04/25/2025    BMI FOLLOWUP  04/25/2025    HEMOGLOBIN A1C  06/05/2025    LIPID PANEL  12/05/2025    ANNUAL WELLNESS VISIT  12/13/2025    TDAP/TD VACCINES (2 - Td or Tdap) 04/25/2034    HEPATITIS C SCREENING  Completed    INFLUENZA VACCINE  Completed    Pneumococcal Vaccine 65+  Completed    ZOSTER VACCINE  Completed          CMS Preventative Services Quick Reference  Risk Factors Identified During Encounter  Immunizations Discussed/Encouraged: COVID19  The above risks/problems have been discussed with the patient.  Pertinent information has been shared with the patient in the After Visit Summary.  An After Visit Summary and PPPS were made available to the patient.    Follow Up:   Next Medicare Wellness visit to be scheduled in 1 year.     Additional E&M Note during same encounter follows:  Patient has multiple medical problems which are significant and separately identifiable that require additional work above and beyond the Medicare Wellness Visit.      Chief Complaint:  Diabetes, blood pressure    Subjective    History of Present Illness:  In addition to the Medicare wellness exam, Kang is being seen today for follow-up on his care.  He has type 2 diabetes for which she remains on treatments as noted.  He states that his blood sugar has been running between 120-150 in the mornings.  His recent A1c was 6.9% .  He has not recently had low blood sugars.     He also has hypertension and elevated cholesterol.  He has not been able to tolerate statin therapy up to this time.  His blood pressure is in a good range.    Review of Systems:  Review of Systems   Constitutional:  Negative for chills and fever.   Respiratory:  Negative for cough and shortness of breath.    Cardiovascular:  Negative for chest pain and palpitations.   Gastrointestinal:  Negative for abdominal pain, nausea and vomiting.      Objective   Vital  "Signs:  /70 (BP Location: Right arm, Patient Position: Sitting)   Pulse 93   Temp 97.7 °F (36.5 °C) (Oral)   Ht 185.4 cm (72.99\")   Wt 101 kg (221 lb 12.8 oz)   SpO2 97%   BMI 29.27 kg/m²     Physical Exam  Vitals and nursing note reviewed.   Constitutional:       General: He is not in acute distress.     Appearance: He is not ill-appearing.   HENT:      Right Ear: Tympanic membrane and ear canal normal.      Left Ear: Tympanic membrane and ear canal normal.      Mouth/Throat:      Mouth: Mucous membranes are moist.      Comments: Pharynx appears normal  Eyes:      Extraocular Movements: Extraocular movements intact.      Pupils: Pupils are equal, round, and reactive to light.   Neck:      Thyroid: No thyromegaly.   Cardiovascular:      Rate and Rhythm: Normal rate and regular rhythm.      Heart sounds: No murmur heard.  Pulmonary:      Effort: Pulmonary effort is normal.      Breath sounds: Normal breath sounds.   Abdominal:      General: There is no distension.      Palpations: Abdomen is soft. There is no mass.      Tenderness: There is no abdominal tenderness.   Musculoskeletal:      Cervical back: Normal range of motion.   Skin:     Findings: No lesion or rash.   Neurological:      General: No focal deficit present.      Mental Status: He is oriented to person, place, and time.      Cranial Nerves: No cranial nerve deficit.   Psychiatric:         Mood and Affect: Mood normal.     The following data was reviewed by Maverick Robins MD on 12/13/2024.  Lab Results   Component Value Date    WBC 7.91 12/05/2024    HGB 17.5 12/05/2024    HCT 53.2 (H) 12/05/2024    MCV 92.0 12/05/2024     12/05/2024     Lab Results   Component Value Date    GLUCOSE 150 (H) 12/05/2024    BUN 18 12/05/2024    CREATININE 1.09 12/05/2024     12/05/2024    K 3.9 12/05/2024     12/05/2024    CALCIUM 9.1 12/05/2024    PROTEINTOT 7.7 12/05/2024    ALBUMIN 4.0 12/05/2024    ALT 33 12/05/2024    AST 21 " 12/05/2024    ALKPHOS 83 12/05/2024    BILITOT 0.6 12/05/2024    GLOB 3.7 12/05/2024    AGRATIO 1.1 12/05/2024    BCR 16.5 12/05/2024    ANIONGAP 11.3 12/05/2024    EGFR 73.5 12/05/2024     Lab Results   Component Value Date    CHOL 211 (H) 12/05/2024    CHLPL 192 04/06/2021    TRIG 141 12/05/2024    HDL 47 12/05/2024     (H) 12/05/2024     Lab Results   Component Value Date    TSH 2.690 12/05/2024     Lab Results   Component Value Date    HGBA1C 6.90 (H) 12/05/2024     Lab Results   Component Value Date    PSA 0.376 04/11/2024    PSA 0.579 01/16/2023    PSA 0.683 11/23/2021             Assessment and Plan:   Today, we have reviewed his care.  Overall, Kang is doing well.  Regarding the Medicare wellness exam, he is currently up-to-date on recommended cancer screenings.  He will be due for colonoscopy again next year, and I will plan to review his chart in May about this.  Clines vaccines today.    Regarding his usual care, we will plan to refill his medications.  Recent labs have been reviewed and show good control of his problems generally.  We discussed potential cardiac risks and we will move ahead with CT cardiac calcium score.  He has not been able to tolerate atorvastatin and would prefer to avoid statin therapy if possible.  Tentative follow-up with me will be again in about 6 months, sooner if needed.    Diagnoses and all orders for this visit:    1. Physical exam (Primary)    2. Type 2 diabetes mellitus with hyperglycemia, without long-term current use of insulin  -     empagliflozin (Jardiance) 25 MG tablet tablet; Take 1 tablet by mouth Daily.  Dispense: 90 tablet; Refill: 3  -     glimepiride (AMARYL) 2 MG tablet; Take 1 tablet by mouth Daily.  Dispense: 90 tablet; Refill: 3  -     metFORMIN ER (GLUCOPHAGE-XR) 500 MG 24 hr tablet; Take 2 tablets by mouth Daily With Breakfast.  Dispense: 180 tablet; Refill: 3  -     CT Cardiac Calcium Score Without Dye; Future    3. Essential hypertension  -      metoprolol succinate XL (TOPROL-XL) 25 MG 24 hr tablet; Take 1 tablet by mouth Daily.  Dispense: 90 tablet; Refill: 3  -     CT Cardiac Calcium Score Without Dye; Future    4. Mixed hyperlipidemia  -     CT Cardiac Calcium Score Without Dye; Future    5. Chronic kidney disease, stage 3a  -     metoprolol succinate XL (TOPROL-XL) 25 MG 24 hr tablet; Take 1 tablet by mouth Daily.  Dispense: 90 tablet; Refill: 3    6. Encounter for screening for coronary artery disease  -     CT Cardiac Calcium Score Without Dye; Future    7. FH: CAD (coronary artery disease)  -     CT Cardiac Calcium Score Without Dye; Future       Follow Up  Return in about 6 months (around 6/13/2025) for Recheck, Next scheduled follow up.  Patient was given instructions and counseling regarding his condition or for health maintenance advice. Please see specific information pulled into the AVS if appropriate.

## 2024-12-13 NOTE — TELEPHONE ENCOUNTER
Records requested from [facility] in [facility] for most recent eye exam

## 2025-04-02 ENCOUNTER — HOSPITAL ENCOUNTER (OUTPATIENT)
Dept: CT IMAGING | Facility: HOSPITAL | Age: 70
Discharge: HOME | End: 2025-04-02
Admitting: FAMILY MEDICINE

## 2025-04-02 DIAGNOSIS — I10 ESSENTIAL HYPERTENSION: ICD-10-CM

## 2025-04-02 DIAGNOSIS — Z82.49 FH: CAD (CORONARY ARTERY DISEASE): ICD-10-CM

## 2025-04-02 DIAGNOSIS — E11.65 TYPE 2 DIABETES MELLITUS WITH HYPERGLYCEMIA, WITHOUT LONG-TERM CURRENT USE OF INSULIN: ICD-10-CM

## 2025-04-02 DIAGNOSIS — Z13.6 ENCOUNTER FOR SCREENING FOR CORONARY ARTERY DISEASE: ICD-10-CM

## 2025-04-02 DIAGNOSIS — E78.2 MIXED HYPERLIPIDEMIA: ICD-10-CM

## 2025-04-02 PROCEDURE — 75571 CT HRT W/O DYE W/CA TEST: CPT

## 2025-05-19 ENCOUNTER — TELEPHONE (OUTPATIENT)
Dept: FAMILY MEDICINE CLINIC | Age: 70
End: 2025-05-19
Payer: MEDICARE

## 2025-05-19 NOTE — TELEPHONE ENCOUNTER
Please call Kang regarding colon cancer screening.  He was noted to have multiple polyps on his most recent exam in June 2022.  He will be due for follow-up exam next month.  May we make referral back to Kassandra Graves MD in [place]?  If so, place referral order for colonoscopy for tubular adenoma colon.  Thanks.

## 2025-06-03 ENCOUNTER — OFFICE VISIT (OUTPATIENT)
Dept: FAMILY MEDICINE CLINIC | Age: 70
End: 2025-06-03
Payer: MEDICARE

## 2025-06-03 ENCOUNTER — LAB (OUTPATIENT)
Dept: LAB | Facility: HOSPITAL | Age: 70
End: 2025-06-03
Payer: MEDICARE

## 2025-06-03 VITALS
WEIGHT: 222 LBS | HEART RATE: 87 BPM | SYSTOLIC BLOOD PRESSURE: 117 MMHG | HEIGHT: 73 IN | OXYGEN SATURATION: 96 % | TEMPERATURE: 97.8 F | DIASTOLIC BLOOD PRESSURE: 71 MMHG | BODY MASS INDEX: 29.42 KG/M2

## 2025-06-03 DIAGNOSIS — I10 ESSENTIAL HYPERTENSION: ICD-10-CM

## 2025-06-03 DIAGNOSIS — E78.2 MIXED HYPERLIPIDEMIA: ICD-10-CM

## 2025-06-03 DIAGNOSIS — N18.31 CHRONIC KIDNEY DISEASE, STAGE 3A: ICD-10-CM

## 2025-06-03 DIAGNOSIS — Z12.5 PROSTATE CANCER SCREENING: ICD-10-CM

## 2025-06-03 DIAGNOSIS — E11.65 TYPE 2 DIABETES MELLITUS WITH HYPERGLYCEMIA, WITHOUT LONG-TERM CURRENT USE OF INSULIN: Primary | ICD-10-CM

## 2025-06-03 DIAGNOSIS — E11.65 TYPE 2 DIABETES MELLITUS WITH HYPERGLYCEMIA, WITHOUT LONG-TERM CURRENT USE OF INSULIN: ICD-10-CM

## 2025-06-03 LAB
ALBUMIN SERPL-MCNC: 4.4 G/DL (ref 3.5–5.2)
ALBUMIN UR-MCNC: 1.5 MG/DL
ALBUMIN/GLOB SERPL: 1.4 G/DL
ALP SERPL-CCNC: 93 U/L (ref 39–117)
ALT SERPL W P-5'-P-CCNC: 23 U/L (ref 1–41)
ANION GAP SERPL CALCULATED.3IONS-SCNC: 10.4 MMOL/L (ref 5–15)
AST SERPL-CCNC: 17 U/L (ref 1–40)
BACTERIA UR QL AUTO: NORMAL /HPF
BILIRUB SERPL-MCNC: 0.4 MG/DL (ref 0–1.2)
BILIRUB UR QL STRIP: NEGATIVE
BUN SERPL-MCNC: 19 MG/DL (ref 8–23)
BUN/CREAT SERPL: 16 (ref 7–25)
CALCIUM SPEC-SCNC: 9.6 MG/DL (ref 8.6–10.5)
CHLORIDE SERPL-SCNC: 101 MMOL/L (ref 98–107)
CLARITY UR: CLEAR
CO2 SERPL-SCNC: 26.6 MMOL/L (ref 22–29)
COLOR UR: YELLOW
CREAT SERPL-MCNC: 1.19 MG/DL (ref 0.76–1.27)
CREAT UR-MCNC: 45.6 MG/DL
EGFRCR SERPLBLD CKD-EPI 2021: 66.1 ML/MIN/1.73
GLOBULIN UR ELPH-MCNC: 3.2 GM/DL
GLUCOSE SERPL-MCNC: 270 MG/DL (ref 65–99)
GLUCOSE UR STRIP-MCNC: ABNORMAL MG/DL
HBA1C MFR BLD: 7 % (ref 4.8–5.6)
HGB UR QL STRIP.AUTO: NEGATIVE
KETONES UR QL STRIP: NEGATIVE
LEUKOCYTE ESTERASE UR QL STRIP.AUTO: NEGATIVE
MICROALBUMIN/CREAT UR: 32.9 MG/G (ref 0–29)
NITRITE UR QL STRIP: NEGATIVE
PH UR STRIP.AUTO: 5.5 [PH] (ref 5–8)
POTASSIUM SERPL-SCNC: 4.3 MMOL/L (ref 3.5–5.2)
PROT SERPL-MCNC: 7.6 G/DL (ref 6–8.5)
PROT UR QL STRIP: NEGATIVE
PSA SERPL-MCNC: 0.4 NG/ML (ref 0–4)
RBC # UR STRIP: NORMAL /HPF
REF LAB TEST METHOD: NORMAL
SODIUM SERPL-SCNC: 138 MMOL/L (ref 136–145)
SP GR UR STRIP: 1.01 (ref 1–1.03)
SQUAMOUS #/AREA URNS HPF: NORMAL /HPF
UROBILINOGEN UR QL STRIP: ABNORMAL
WBC # UR STRIP: NORMAL /HPF

## 2025-06-03 PROCEDURE — 82043 UR ALBUMIN QUANTITATIVE: CPT

## 2025-06-03 PROCEDURE — 82570 ASSAY OF URINE CREATININE: CPT

## 2025-06-03 PROCEDURE — G0103 PSA SCREENING: HCPCS

## 2025-06-03 PROCEDURE — 83036 HEMOGLOBIN GLYCOSYLATED A1C: CPT

## 2025-06-03 PROCEDURE — 36415 COLL VENOUS BLD VENIPUNCTURE: CPT

## 2025-06-03 PROCEDURE — 81001 URINALYSIS AUTO W/SCOPE: CPT

## 2025-06-03 PROCEDURE — 80053 COMPREHEN METABOLIC PANEL: CPT

## 2025-06-03 RX ORDER — METFORMIN HYDROCHLORIDE 500 MG/1
1000 TABLET, EXTENDED RELEASE ORAL
Qty: 180 TABLET | Refills: 3 | Status: SHIPPED | OUTPATIENT
Start: 2025-06-03

## 2025-06-03 RX ORDER — METOPROLOL SUCCINATE 25 MG/1
25 TABLET, EXTENDED RELEASE ORAL DAILY
Qty: 90 TABLET | Refills: 3 | Status: SHIPPED | OUTPATIENT
Start: 2025-06-03

## 2025-06-03 NOTE — PROGRESS NOTES
Kang Mantilla presents to [facility].    Chief Complaint:  Diabetes, blood pressure    Subjective   History of Present Illness:  Kang is being seen today for follow-up on his care.  He has type 2 diabetes for which he remains on treatments as noted.  He has noted an increase in his blood sugars.  They are averaging approximately 180.  He does tend to see a sugar run a bit higher in the winter, but it has not trended downward thus far in the spring.  He denies any low blood sugars.     He also has hypertension and elevated cholesterol.  His blood pressure is in a good range here.  It typically runs in a good range at home as well.  Regarding cholesterol, he has not been able to tolerate statin therapy, but calcium score was 0 on most recent testing.    Review of Systems:  Review of Systems   Constitutional:  Negative for chills, fever and unexpected weight loss.   Eyes:  Negative for blurred vision.   Respiratory:  Negative for cough and shortness of breath.    Cardiovascular:  Negative for chest pain and palpitations.   Gastrointestinal:  Negative for abdominal pain, nausea and vomiting.   Endocrine: Positive for polyuria. Negative for polydipsia.   Neurological:  Negative for tremors, speech difficulty and confusion.      Objective   Medical History:  Past Medical History:    Chronic kidney disease, stage 3a    Colon polyps    FOLLOWED BY DR.NECHOL ROWAN    Essential hypertension    Type 2 diabetes mellitus with hyperglycemia     Past Surgical History:    COLONOSCOPY    5 MM TUBULAR ADENOMA POLYP IN TRANSVERSE, 6 MM TUBULAR ADENOMA POLYP IN DESCENDING, 2 TUBULAR ADENOMA POLYPS IN DISTAL DESCENDING, 5 MM  TUBULAR ADENOMA POLYP IN SIGMOID, 1 TUBULAR ADENOMA POLYP IN RECTUM, 1 BENIGN POLYP IN RECTUM, MULTIPLE DIVERTICULA IN SIGMOID, RESCOPE IN 3 YRS, DR. KELLY ROWAN AT [facility]    COLONOSCOPY    Multiple tubular adenomas, diverticulosis    COLONOSCOPY    Multiple tubular adenomas    JOINT  REPLACEMENT      Family History   Problem Relation Age of Onset    Malig Hyperthermia Neg Hx      Social History     Tobacco Use    Smoking status: Never    Smokeless tobacco: Never   Substance Use Topics    Alcohol use: Yes     Alcohol/week: 12.0 standard drinks of alcohol     Types: 12 Cans of beer per week     Comment: WEEKLY       Health Maintenance Due   Topic Date Due    URINE MICROALBUMIN-CREATININE RATIO (uACR)  08/13/2021    DIABETIC EYE EXAM  08/01/2024    HEMOGLOBIN A1C  06/05/2025    COLORECTAL CANCER SCREENING  06/08/2025        Immunization History   Administered Date(s) Administered    Arexvy (RSV, Adults 60+ yrs) 04/25/2024    COVID-19 (MODERNA) 1st,2nd,3rd Dose Monovalent 03/04/2021, 04/02/2021, 12/01/2021    Flu Vaccine Intradermal Quad 18-64YR 10/14/2021    Flucelvax Quad Vial =>4yrs 10/01/2021    Fluzone  >6mos 11/03/2019    Fluzone High-Dose 65+YRS 09/16/2024    Fluzone High-Dose 65+yrs 10/14/2022, 11/20/2023    Influenza Injectable Mdck Pf Quad 10/14/2021    Influenza TIV (IM) 11/03/2019    Influenza, Unspecified 10/14/2021    Pneumococcal Conjugate 20-Valent (PCV20) 08/26/2022    Shingrix 12/03/2016, 06/02/2017    Tdap 04/25/2024       Allergies   Allergen Reactions    Atorvastatin Other (See Comments)     weakness    Lisinopril Cough        Medications:    Current Outpatient Medications:     Blood Glucose Monitoring Suppl (True Metrix Meter) w/Device kit, 1 kit Daily. Check blood sugar daily  DX E11.9, Disp: 1 kit, Rfl: 0    empagliflozin (Jardiance) 25 MG tablet tablet, Take 1 tablet by mouth Daily., Disp: 90 tablet, Rfl: 3    glimepiride (AMARYL) 2 MG tablet, Take 1 tablet by mouth Daily., Disp: 90 tablet, Rfl: 3    glucose blood (True Metrix Blood Glucose Test) test strip, 1 each by Other route Daily., Disp: 100 each, Rfl: 3    metFORMIN ER (GLUCOPHAGE-XR) 500 MG 24 hr tablet, Take 2 tablets by mouth Daily With Breakfast., Disp: 180 tablet, Rfl: 3    metoprolol succinate XL (TOPROL-XL) 25  "MG 24 hr tablet, Take 1 tablet by mouth Daily., Disp: 90 tablet, Rfl: 3    TRUEplus Lancets 30G misc, CHECK BLOOD SUGAR EVERY DAY, Disp: 100 each, Rfl: 3    Vital Signs:   /71 (BP Location: Left arm, Patient Position: Sitting)   Pulse 87   Temp 97.8 °F (36.6 °C) (Oral)   Ht 185.4 cm (72.99\")   Wt 101 kg (222 lb)   SpO2 96%   BMI 29.30 kg/m²       Physical Exam:  Physical Exam  Vitals reviewed.   Constitutional:       General: He is not in acute distress.     Appearance: He is not ill-appearing.   Eyes:      Pupils: Pupils are equal, round, and reactive to light.   Neck:      Comments: No thyromegaly  Cardiovascular:      Rate and Rhythm: Normal rate and regular rhythm.      Pulses:           Dorsalis pedis pulses are 2+ on the right side and 2+ on the left side.   Pulmonary:      Effort: Pulmonary effort is normal.      Breath sounds: Normal breath sounds.   Abdominal:      General: There is no distension.      Palpations: Abdomen is soft.      Tenderness: There is no abdominal tenderness.   Musculoskeletal:      Cervical back: Neck supple.   Feet:      Right foot:      Protective Sensation: 3 sites tested.  3 sites sensed.      Skin integrity: Callus present.      Left foot:      Protective Sensation: 3 sites tested.  3 sites sensed.      Skin integrity: Callus present.   Lymphadenopathy:      Cervical: No cervical adenopathy.   Skin:     Findings: No lesion or rash.   Neurological:      Mental Status: He is alert.     Result Review   The following data was reviewed by Maverick Robins MD on 06/03/2025.  Lab Results   Component Value Date    WBC 7.91 12/05/2024    HGB 17.5 12/05/2024    HCT 53.2 (H) 12/05/2024    MCV 92.0 12/05/2024     12/05/2024     Lab Results   Component Value Date    GLUCOSE 150 (H) 12/05/2024    BUN 18 12/05/2024    CREATININE 1.09 12/05/2024     12/05/2024    K 3.9 12/05/2024     12/05/2024    CALCIUM 9.1 12/05/2024    PROTEINTOT 7.7 12/05/2024    ALBUMIN " 4.0 12/05/2024    ALT 33 12/05/2024    AST 21 12/05/2024    ALKPHOS 83 12/05/2024    BILITOT 0.6 12/05/2024    GLOB 3.7 12/05/2024    AGRATIO 1.1 12/05/2024    BCR 16.5 12/05/2024    ANIONGAP 11.3 12/05/2024    EGFR 73.5 12/05/2024     Lab Results   Component Value Date    CHOL 211 (H) 12/05/2024    CHLPL 192 04/06/2021    TRIG 141 12/05/2024    HDL 47 12/05/2024     (H) 12/05/2024     Lab Results   Component Value Date    TSH 2.690 12/05/2024     Lab Results   Component Value Date    HGBA1C 6.90 (H) 12/05/2024     Lab Results   Component Value Date    PSA 0.376 04/11/2024    PSA 0.579 01/16/2023    PSA 0.683 11/23/2021     BMI is >= 25 and <30. (Overweight) The following options were offered after discussion;: exercise counseling/recommendations and nutrition counseling/recommendations         Assessment and Plan:   Today, we have reviewed his care.  Overall, Kang seems well.  Regarding diabetes, his sugars have been trending upward, and we will see where his A1c is.  We may need to consider stopping glimepiride and replacing with GLP-1 agonist or other therapy.  His other problems seem stable.  For now, we will refill his medications and update labs as noted.  Tentative follow-up with me will be again in about 6 months, sooner if needed.    Diagnoses and all orders for this visit:    1. Type 2 diabetes mellitus with hyperglycemia, without long-term current use of insulin (Primary)  -     Hemoglobin A1c; Future  -     Comprehensive Metabolic Panel; Future  -     Microalbumin / Creatinine Urine Ratio - Urine, Clean Catch; Future  -     Urinalysis With Microscopic - Urine, Clean Catch; Future  -     empagliflozin (Jardiance) 25 MG tablet tablet; Take 1 tablet by mouth Daily.  Dispense: 90 tablet; Refill: 3  -     metFORMIN ER (GLUCOPHAGE-XR) 500 MG 24 hr tablet; Take 2 tablets by mouth Daily With Breakfast.  Dispense: 180 tablet; Refill: 3    2. Essential hypertension  -     Comprehensive Metabolic Panel;  Future  -     metoprolol succinate XL (TOPROL-XL) 25 MG 24 hr tablet; Take 1 tablet by mouth Daily.  Dispense: 90 tablet; Refill: 3    3. Mixed hyperlipidemia  -     Comprehensive Metabolic Panel; Future    4. Chronic kidney disease, stage 3a  -     Comprehensive Metabolic Panel; Future  -     Urinalysis With Microscopic - Urine, Clean Catch; Future  -     metoprolol succinate XL (TOPROL-XL) 25 MG 24 hr tablet; Take 1 tablet by mouth Daily.  Dispense: 90 tablet; Refill: 3    5. Prostate cancer screening  -     PSA Screen; Future    Follow Up  Return in about 6 months (around 12/3/2025) for Recheck, Next scheduled follow up.  Patient was given instructions and counseling regarding his condition or for health maintenance advice. Please see specific information pulled into the AVS if appropriate.

## 2025-06-04 ENCOUNTER — RESULTS FOLLOW-UP (OUTPATIENT)
Dept: LAB | Facility: HOSPITAL | Age: 70
End: 2025-06-04
Payer: MEDICARE

## 2025-06-04 DIAGNOSIS — E11.65 TYPE 2 DIABETES MELLITUS WITH HYPERGLYCEMIA, WITHOUT LONG-TERM CURRENT USE OF INSULIN: Primary | ICD-10-CM

## 2025-06-26 NOTE — TELEPHONE ENCOUNTER
Caller: DOLORES CLAY    Relationship: Emergency Contact    Best call back number:   Telephone Information:   Mobile [phone]         Requested Prescriptions:   Requested Prescriptions      No prescriptions requested or ordered in this encounter      [facility]      Pharmacy where request should be sent: [facility] - [place], [place] - [address] [phone] [facility] [phone]      Last office visit with prescribing clinician: 6/3/2025   Last telemedicine visit with prescribing clinician: Visit date not found   Next office visit with prescribing clinician: 12/29/2025     Additional details provided by patient:      THE PATIENT'S WIFE SAID THAT [facility] SAID  THEY SENT OVER A REQUEST FOR REFILL FOR THE [facility] AND  HAVE HAD NO RESPONSE. SHE SAID THEY ARE NEEDING IT FOR  3 MONTHS SUPPLY. SHE IS NOT SURE IF PCP IS WANTING TO INCREASE THE DOSAGE. SHE SAID THEY WILL NOT SEND IT UNTIL THEY HEAR FROM THE PROVIDER     SHE SAID HE HAS  ONE WEEK LEFT AND WILL USE IT MONDAY          Darline Barboza   06/26/25 09:27 EDT

## 2025-06-27 NOTE — TELEPHONE ENCOUNTER
Spoke to pt wife, pt is doing well and ready for the next dose, would like that sent to [facility]. Rx queued.    Forwarding to on call Dr Hung.

## 2025-08-11 ENCOUNTER — PREP FOR SURGERY (OUTPATIENT)
Dept: OTHER | Facility: HOSPITAL | Age: 70
End: 2025-08-11
Payer: MEDICARE

## 2025-08-11 DIAGNOSIS — Z86.0100 HISTORY OF COLON POLYPS: Primary | ICD-10-CM

## 2025-08-14 ENCOUNTER — TELEPHONE (OUTPATIENT)
Dept: FAMILY MEDICINE CLINIC | Age: 70
End: 2025-08-14
Payer: MEDICARE

## 2025-08-14 DIAGNOSIS — E11.65 TYPE 2 DIABETES MELLITUS WITH HYPERGLYCEMIA, WITHOUT LONG-TERM CURRENT USE OF INSULIN: Primary | ICD-10-CM

## (undated) DEVICE — ADAPT CLN BIOGUARD AIR/H2O DISP

## (undated) DEVICE — SNAR POLYP SENSATION STDOVL 27 240 BX40

## (undated) DEVICE — KT ORCA ORCAPOD DISP STRL

## (undated) DEVICE — THE SINGLE USE ETRAP – POLYP TRAP IS USED FOR SUCTION RETRIEVAL OF ENDOSCOPICALLY REMOVED POLYPS.: Brand: ETRAP

## (undated) DEVICE — CANN O2 ETCO2 FITS ALL CONN CO2 SMPL A/ 7IN DISP LF

## (undated) DEVICE — LN SMPL CO2 SHTRM SD STREAM W/M LUER

## (undated) DEVICE — SENSR O2 OXIMAX FNGR A/ 18IN NONSTR

## (undated) DEVICE — TUBING, SUCTION, 1/4" X 10', STRAIGHT: Brand: MEDLINE